# Patient Record
Sex: FEMALE | Race: BLACK OR AFRICAN AMERICAN | NOT HISPANIC OR LATINO | Employment: FULL TIME | URBAN - METROPOLITAN AREA
[De-identification: names, ages, dates, MRNs, and addresses within clinical notes are randomized per-mention and may not be internally consistent; named-entity substitution may affect disease eponyms.]

---

## 2023-02-20 PROBLEM — S62.600A FRACTURE OF PHALANX OF RIGHT INDEX FINGER: Status: ACTIVE | Noted: 2023-02-20

## 2023-02-20 PROBLEM — S62.630K: Status: ACTIVE | Noted: 2023-02-20

## 2023-02-20 PROBLEM — R53.83 FATIGUE: Status: ACTIVE | Noted: 2023-02-20

## 2023-02-20 PROBLEM — J01.90 ACUTE SINUSITIS: Status: ACTIVE | Noted: 2023-02-20

## 2023-02-20 PROBLEM — D64.9 ANEMIA: Status: ACTIVE | Noted: 2023-02-20

## 2023-02-20 PROBLEM — N95.1 POST MENOPAUSAL SYNDROME: Status: ACTIVE | Noted: 2023-02-20

## 2023-02-20 PROBLEM — M51.27 OTHER INTERVERTEBRAL DISC DISPLACEMENT, LUMBOSACRAL REGION: Status: ACTIVE | Noted: 2023-02-20

## 2023-02-20 PROBLEM — N76.0 BV (BACTERIAL VAGINOSIS): Status: ACTIVE | Noted: 2023-02-20

## 2023-02-20 PROBLEM — M51.26 HERNIATED NUCLEUS PULPOSUS, L4-5: Status: ACTIVE | Noted: 2023-02-20

## 2023-02-20 PROBLEM — J30.9 ALLERGIC RHINITIS: Status: ACTIVE | Noted: 2023-02-20

## 2023-02-20 PROBLEM — M25.50 POLYARTHRALGIA: Status: ACTIVE | Noted: 2023-02-20

## 2023-02-20 PROBLEM — M51.26 HERNIATED NUCLEUS PULPOSUS, L2-3: Status: ACTIVE | Noted: 2023-02-20

## 2023-02-20 PROBLEM — I47.10 SUPRAVENTRICULAR TACHYCARDIA BY ECG (CMS-HCC): Status: ACTIVE | Noted: 2023-02-20

## 2023-02-20 PROBLEM — M35.1 MCTD (MIXED CONNECTIVE TISSUE DISEASE) (MULTI): Status: ACTIVE | Noted: 2023-02-20

## 2023-02-20 PROBLEM — S61.309A NAIL AVULSION, FINGER: Status: ACTIVE | Noted: 2023-02-20

## 2023-02-20 PROBLEM — R01.1 HEART MURMUR: Status: ACTIVE | Noted: 2023-02-20

## 2023-02-20 PROBLEM — M51.26 HERNIATED NUCLEUS PULPOSUS, L3-4: Status: ACTIVE | Noted: 2023-02-20

## 2023-02-20 PROBLEM — R76.8 POSITIVE SM/RNP ANTIBODY: Status: ACTIVE | Noted: 2023-02-20

## 2023-02-20 PROBLEM — N95.1 PERIMENOPAUSAL: Status: ACTIVE | Noted: 2023-02-20

## 2023-02-20 PROBLEM — M32.9 SYSTEMIC LUPUS ERYTHEMATOSUS (MULTI): Status: ACTIVE | Noted: 2023-02-20

## 2023-02-20 PROBLEM — I10 BENIGN ESSENTIAL HYPERTENSION: Status: ACTIVE | Noted: 2023-02-20

## 2023-02-20 PROBLEM — R00.2 PALPITATIONS: Status: ACTIVE | Noted: 2023-02-20

## 2023-02-20 PROBLEM — M54.50 LOWER BACK PAIN: Status: ACTIVE | Noted: 2023-02-20

## 2023-02-20 PROBLEM — R76.8 SS-A ANTIBODY POSITIVE: Status: ACTIVE | Noted: 2023-02-20

## 2023-02-20 PROBLEM — R79.9 ABNORMAL BLOOD CHEMISTRY: Status: ACTIVE | Noted: 2023-02-20

## 2023-02-20 PROBLEM — R35.0 URINARY FREQUENCY: Status: ACTIVE | Noted: 2023-02-20

## 2023-02-20 PROBLEM — L81.9 HYPERPIGMENTATION OF SKIN: Status: ACTIVE | Noted: 2023-02-20

## 2023-02-20 PROBLEM — M25.561 RIGHT KNEE PAIN: Status: ACTIVE | Noted: 2023-02-20

## 2023-02-20 PROBLEM — M25.511 PAIN IN JOINT OF RIGHT SHOULDER: Status: ACTIVE | Noted: 2023-02-20

## 2023-02-20 PROBLEM — B96.89 BV (BACTERIAL VAGINOSIS): Status: ACTIVE | Noted: 2023-02-20

## 2023-02-20 PROBLEM — M17.12 DEGENERATIVE ARTHRITIS OF LEFT KNEE: Status: ACTIVE | Noted: 2023-02-20

## 2023-02-20 PROBLEM — M19.90 OSTEOARTHROSIS: Status: ACTIVE | Noted: 2023-02-20

## 2023-02-20 PROBLEM — E55.9 VITAMIN D DEFICIENCY: Status: ACTIVE | Noted: 2023-02-20

## 2023-02-20 RX ORDER — FLUTICASONE PROPIONATE 50 MCG
2 SPRAY, SUSPENSION (ML) NASAL
COMMUNITY
Start: 2020-03-31 | End: 2024-03-05 | Stop reason: ALTCHOICE

## 2023-02-20 RX ORDER — AMLODIPINE BESYLATE 10 MG/1
1 TABLET ORAL DAILY
COMMUNITY
Start: 2012-12-27 | End: 2023-07-10 | Stop reason: SDUPTHER

## 2023-02-20 RX ORDER — FOLIC ACID 1 MG/1
1 TABLET ORAL DAILY
COMMUNITY
Start: 2017-11-02

## 2023-02-20 RX ORDER — ERGOCALCIFEROL 1.25 MG/1
1 CAPSULE ORAL
COMMUNITY
Start: 2021-04-20 | End: 2024-03-05 | Stop reason: ALTCHOICE

## 2023-02-20 RX ORDER — HYDROCODONE BITARTRATE AND ACETAMINOPHEN 7.5; 325 MG/1; MG/1
1 TABLET ORAL EVERY 6 HOURS PRN
COMMUNITY
Start: 2022-08-19 | End: 2023-10-12 | Stop reason: ALTCHOICE

## 2023-02-20 RX ORDER — NALOXONE HYDROCHLORIDE 4 MG/.1ML
SPRAY NASAL
COMMUNITY
Start: 2022-05-13

## 2023-02-20 RX ORDER — TRIAMTERENE/HYDROCHLOROTHIAZID 37.5-25 MG
1 TABLET ORAL DAILY
COMMUNITY
Start: 2020-12-10 | End: 2023-11-20

## 2023-02-20 RX ORDER — HYDROCODONE BITARTRATE AND ACETAMINOPHEN 7.5; 325 MG/1; MG/1
1 TABLET ORAL 3 TIMES DAILY
COMMUNITY
Start: 2022-09-17 | End: 2023-10-12 | Stop reason: SDUPTHER

## 2023-02-20 RX ORDER — METHOTREXATE 2.5 MG/1
5 TABLET ORAL
COMMUNITY
Start: 2017-11-02

## 2023-02-20 RX ORDER — LIDOCAINE 50 MG/G
OINTMENT TOPICAL 3 TIMES DAILY
COMMUNITY
Start: 2021-04-15 | End: 2023-11-20

## 2023-02-20 RX ORDER — GABAPENTIN 300 MG/1
1 CAPSULE ORAL 2 TIMES DAILY
COMMUNITY
Start: 2017-03-17 | End: 2023-12-12 | Stop reason: ALTCHOICE

## 2023-03-21 ENCOUNTER — OFFICE VISIT (OUTPATIENT)
Dept: PRIMARY CARE | Facility: CLINIC | Age: 58
End: 2023-03-21
Payer: COMMERCIAL

## 2023-03-21 VITALS — SYSTOLIC BLOOD PRESSURE: 150 MMHG | DIASTOLIC BLOOD PRESSURE: 88 MMHG

## 2023-03-21 DIAGNOSIS — I10 BENIGN ESSENTIAL HYPERTENSION: Primary | ICD-10-CM

## 2023-03-21 DIAGNOSIS — Z00.00 ROUTINE HEALTH MAINTENANCE: ICD-10-CM

## 2023-03-21 DIAGNOSIS — E55.9 VITAMIN D DEFICIENCY: ICD-10-CM

## 2023-03-21 DIAGNOSIS — M32.9 SYSTEMIC LUPUS ERYTHEMATOSUS, UNSPECIFIED SLE TYPE, UNSPECIFIED ORGAN INVOLVEMENT STATUS (MULTI): ICD-10-CM

## 2023-03-21 PROCEDURE — 99203 OFFICE O/P NEW LOW 30 MIN: CPT | Performed by: INTERNAL MEDICINE

## 2023-03-21 PROCEDURE — 3077F SYST BP >= 140 MM HG: CPT | Performed by: INTERNAL MEDICINE

## 2023-03-21 PROCEDURE — 3079F DIAST BP 80-89 MM HG: CPT | Performed by: INTERNAL MEDICINE

## 2023-03-21 ASSESSMENT — ENCOUNTER SYMPTOMS
ABDOMINAL PAIN: 0
LIGHT-HEADEDNESS: 0
PALPITATIONS: 0
ABDOMINAL DISTENTION: 0
VOMITING: 0
DIARRHEA: 0
AGITATION: 0
FEVER: 0
WHEEZING: 0
WEAKNESS: 0
ACTIVITY CHANGE: 0
FATIGUE: 0
CONFUSION: 0
DIZZINESS: 0
SHORTNESS OF BREATH: 0
COUGH: 0
NAUSEA: 0

## 2023-03-21 NOTE — PROGRESS NOTES
Subjective   Roshni Loomis is a 57 y.o. female with PMHx of HTN and SLE who is here today to Establish Care.    HPI    Patient feeling well today with no current concerns or complaints. States that she is getting over some sinus congestion. Admits to some right heel pain from what she thinks is a developing heel spur.     PMHx: HTN, SLE  FamHx: HTN (mother), SLE (brother)   SurgHx: Fibroids   Allergies: None   SocHx: Never smoker, denies alcohol/marijuna/drugs       Review of Systems   Constitutional:  Negative for activity change, fatigue and fever.   Respiratory:  Negative for cough, shortness of breath and wheezing.    Cardiovascular:  Negative for chest pain, palpitations and leg swelling.   Gastrointestinal:  Negative for abdominal distention, abdominal pain, diarrhea, nausea and vomiting.   Neurological:  Negative for dizziness, weakness and light-headedness.   Psychiatric/Behavioral:  Negative for agitation and confusion.        Objective   Physical Exam  Constitutional:       Appearance: Normal appearance.   Cardiovascular:      Rate and Rhythm: Normal rate and regular rhythm.      Heart sounds: Normal heart sounds. No murmur heard.     No friction rub. No gallop.   Pulmonary:      Effort: Pulmonary effort is normal.      Breath sounds: Normal breath sounds. No wheezing or rhonchi.   Abdominal:      General: Bowel sounds are normal. There is no distension.      Palpations: Abdomen is soft.      Tenderness: There is no abdominal tenderness.   Musculoskeletal:         General: No swelling.   Skin:     General: Skin is warm and dry.      Findings: No bruising or rash.   Neurological:      General: No focal deficit present.      Mental Status: She is alert and oriented to person, place, and time.   Psychiatric:         Mood and Affect: Mood normal.         Thought Content: Thought content normal.         Judgment: Judgment normal.         Assessment/Plan   Problem List Items Addressed This Visit           Circulatory    Benign essential hypertension - Primary     - Stable, BP today in office 150/88   - Continue Amlodipine 10mg daily          Relevant Orders    Comprehensive Metabolic Panel    Lipid Panel    TSH with reflex to Free T4 if abnormal    Albumin, urine, random       Endocrine/Metabolic    Vitamin D deficiency    Relevant Orders    Vitamin D 1,25 Dihydroxy       Other    Systemic lupus erythematosus (CMS/HCC)    Relevant Orders    CBC and Auto Differential     Other Visit Diagnoses       Routine health maintenance        Relevant Orders    Comprehensive Metabolic Panel    Hemoglobin A1C    Lipid Panel    TSH with reflex to Free T4 if abnormal    Vitamin D 1,25 Dihydroxy    CBC and Auto Differential          Health Maintenance  - New patient will check yearly labs: CBC, CMP, Vitamin D, Lipids, TSH, HbA1c  - Mammogram/Pap UTD, follows with OB/GYN  - Colonoscopy UTD    Follow up in 3 months

## 2023-03-27 LAB
APPEARANCE, URINE: CLEAR
BILIRUBIN, URINE: NEGATIVE
BLOOD, URINE: NEGATIVE
COLOR, URINE: YELLOW
GLUCOSE, URINE: NEGATIVE MG/DL
KETONES, URINE: NEGATIVE MG/DL
LEUKOCYTE ESTERASE, URINE: NEGATIVE
NITRITE, URINE: NEGATIVE
PH, URINE: 5 (ref 5–8)
PROTEIN, URINE: NEGATIVE MG/DL
SPECIFIC GRAVITY, URINE: 1.02 (ref 1–1.03)
UROBILINOGEN, URINE: <2 MG/DL (ref 0–1.9)

## 2023-03-28 LAB — URINE CULTURE: NORMAL

## 2023-03-30 ENCOUNTER — LAB (OUTPATIENT)
Dept: LAB | Facility: LAB | Age: 58
End: 2023-03-30
Payer: COMMERCIAL

## 2023-03-30 DIAGNOSIS — M32.9 SYSTEMIC LUPUS ERYTHEMATOSUS, UNSPECIFIED SLE TYPE, UNSPECIFIED ORGAN INVOLVEMENT STATUS (MULTI): ICD-10-CM

## 2023-03-30 DIAGNOSIS — I10 BENIGN ESSENTIAL HYPERTENSION: ICD-10-CM

## 2023-03-30 DIAGNOSIS — E55.9 VITAMIN D DEFICIENCY: ICD-10-CM

## 2023-03-30 DIAGNOSIS — Z00.00 ROUTINE HEALTH MAINTENANCE: ICD-10-CM

## 2023-03-30 LAB
ALANINE AMINOTRANSFERASE (SGPT) (U/L) IN SER/PLAS: 13 U/L (ref 7–45)
ALBUMIN (G/DL) IN SER/PLAS: 4.1 G/DL (ref 3.4–5)
ALBUMIN (MG/L) IN URINE: 20.2 MG/L
ALBUMIN/CREATININE (UG/MG) IN URINE: 14.4 UG/MG CRT (ref 0–30)
ALKALINE PHOSPHATASE (U/L) IN SER/PLAS: 42 U/L (ref 33–110)
ANION GAP IN SER/PLAS: 11 MMOL/L (ref 10–20)
ASPARTATE AMINOTRANSFERASE (SGOT) (U/L) IN SER/PLAS: 17 U/L (ref 9–39)
BASOPHILS (10*3/UL) IN BLOOD BY AUTOMATED COUNT: 0.03 X10E9/L (ref 0–0.1)
BASOPHILS/100 LEUKOCYTES IN BLOOD BY AUTOMATED COUNT: 0.8 % (ref 0–2)
BILIRUBIN TOTAL (MG/DL) IN SER/PLAS: 0.5 MG/DL (ref 0–1.2)
CALCIUM (MG/DL) IN SER/PLAS: 8.9 MG/DL (ref 8.6–10.3)
CARBON DIOXIDE, TOTAL (MMOL/L) IN SER/PLAS: 30 MMOL/L (ref 21–32)
CHLORIDE (MMOL/L) IN SER/PLAS: 104 MMOL/L (ref 98–107)
CHOLESTEROL (MG/DL) IN SER/PLAS: 127 MG/DL (ref 0–199)
CHOLESTEROL IN HDL (MG/DL) IN SER/PLAS: 48.5 MG/DL
CHOLESTEROL/HDL RATIO: 2.6
CREATININE (MG/DL) IN SER/PLAS: 0.8 MG/DL (ref 0.5–1.05)
CREATININE (MG/DL) IN URINE: 140 MG/DL (ref 20–320)
EOSINOPHILS (10*3/UL) IN BLOOD BY AUTOMATED COUNT: 0.05 X10E9/L (ref 0–0.7)
EOSINOPHILS/100 LEUKOCYTES IN BLOOD BY AUTOMATED COUNT: 1.4 % (ref 0–6)
ERYTHROCYTE DISTRIBUTION WIDTH (RATIO) BY AUTOMATED COUNT: 14.9 % (ref 11.5–14.5)
ERYTHROCYTE MEAN CORPUSCULAR HEMOGLOBIN CONCENTRATION (G/DL) BY AUTOMATED: 31.1 G/DL (ref 32–36)
ERYTHROCYTE MEAN CORPUSCULAR VOLUME (FL) BY AUTOMATED COUNT: 90 FL (ref 80–100)
ERYTHROCYTES (10*6/UL) IN BLOOD BY AUTOMATED COUNT: 4.33 X10E12/L (ref 4–5.2)
ESTIMATED AVERAGE GLUCOSE FOR HBA1C: 117 MG/DL
GFR FEMALE: 87 ML/MIN/1.73M2
GLUCOSE (MG/DL) IN SER/PLAS: 80 MG/DL (ref 74–99)
HEMATOCRIT (%) IN BLOOD BY AUTOMATED COUNT: 38.9 % (ref 36–46)
HEMOGLOBIN (G/DL) IN BLOOD: 12.1 G/DL (ref 12–16)
HEMOGLOBIN A1C/HEMOGLOBIN TOTAL IN BLOOD: 5.7 %
IMMATURE GRANULOCYTES/100 LEUKOCYTES IN BLOOD BY AUTOMATED COUNT: 0.6 % (ref 0–0.9)
LDL: 69 MG/DL (ref 0–99)
LEUKOCYTES (10*3/UL) IN BLOOD BY AUTOMATED COUNT: 3.6 X10E9/L (ref 4.4–11.3)
LYMPHOCYTES (10*3/UL) IN BLOOD BY AUTOMATED COUNT: 1.71 X10E9/L (ref 1.2–4.8)
LYMPHOCYTES/100 LEUKOCYTES IN BLOOD BY AUTOMATED COUNT: 47.9 % (ref 13–44)
MONOCYTES (10*3/UL) IN BLOOD BY AUTOMATED COUNT: 0.47 X10E9/L (ref 0.1–1)
MONOCYTES/100 LEUKOCYTES IN BLOOD BY AUTOMATED COUNT: 13.2 % (ref 2–10)
NEUTROPHILS (10*3/UL) IN BLOOD BY AUTOMATED COUNT: 1.29 X10E9/L (ref 1.2–7.7)
NEUTROPHILS/100 LEUKOCYTES IN BLOOD BY AUTOMATED COUNT: 36.1 % (ref 40–80)
PLATELETS (10*3/UL) IN BLOOD AUTOMATED COUNT: 405 X10E9/L (ref 150–450)
POTASSIUM (MMOL/L) IN SER/PLAS: 3.7 MMOL/L (ref 3.5–5.3)
PROTEIN TOTAL: 7.9 G/DL (ref 6.4–8.2)
SODIUM (MMOL/L) IN SER/PLAS: 141 MMOL/L (ref 136–145)
THYROTROPIN (MIU/L) IN SER/PLAS BY DETECTION LIMIT <= 0.05 MIU/L: 1.73 MIU/L (ref 0.44–3.98)
TRIGLYCERIDE (MG/DL) IN SER/PLAS: 48 MG/DL (ref 0–149)
UREA NITROGEN (MG/DL) IN SER/PLAS: 16 MG/DL (ref 6–23)
VLDL: 10 MG/DL (ref 0–40)

## 2023-03-30 PROCEDURE — 84443 ASSAY THYROID STIM HORMONE: CPT

## 2023-03-30 PROCEDURE — 82570 ASSAY OF URINE CREATININE: CPT

## 2023-03-30 PROCEDURE — 83036 HEMOGLOBIN GLYCOSYLATED A1C: CPT

## 2023-03-30 PROCEDURE — 82043 UR ALBUMIN QUANTITATIVE: CPT

## 2023-03-30 PROCEDURE — 85025 COMPLETE CBC W/AUTO DIFF WBC: CPT

## 2023-03-30 PROCEDURE — 80053 COMPREHEN METABOLIC PANEL: CPT

## 2023-03-30 PROCEDURE — 82652 VIT D 1 25-DIHYDROXY: CPT

## 2023-03-30 PROCEDURE — 36415 COLL VENOUS BLD VENIPUNCTURE: CPT

## 2023-03-30 PROCEDURE — 80061 LIPID PANEL: CPT

## 2023-04-03 LAB — VITAMIN D 1,25-DIHYDROXY: 51.8 PG/ML (ref 19.9–79.3)

## 2023-04-27 LAB
6-ACETYLMORPHINE: <25 NG/ML
7-AMINOCLONAZEPAM: <25 NG/ML
ALPHA-HYDROXYALPRAZOLAM: <25 NG/ML
ALPHA-HYDROXYMIDAZOLAM: <25 NG/ML
ALPRAZOLAM: <25 NG/ML
AMPHETAMINE (PRESENCE) IN URINE BY SCREEN METHOD: ABNORMAL
BARBITURATES PRESENCE IN URINE BY SCREEN METHOD: ABNORMAL
CANNABINOIDS IN URINE BY SCREEN METHOD: ABNORMAL
CHLORDIAZEPOXIDE: <25 NG/ML
CLONAZEPAM: <25 NG/ML
COCAINE (PRESENCE) IN URINE BY SCREEN METHOD: ABNORMAL
CODEINE: <50 NG/ML
CREATINE, URINE FOR DRUG: 80.4 MG/DL
DIAZEPAM: <25 NG/ML
DRUG SCREEN COMMENT URINE: ABNORMAL
EDDP: <25 NG/ML
FENTANYL CONFIRMATION, URINE: <2.5 NG/ML
HYDROCODONE: 902 NG/ML
HYDROMORPHONE: 146 NG/ML
LORAZEPAM: <25 NG/ML
METHADONE CONFIRMATION,URINE: <25 NG/ML
MIDAZOLAM: <25 NG/ML
MORPHINE URINE: <50 NG/ML
NORDIAZEPAM: <25 NG/ML
NORFENTANYL: <2.5 NG/ML
NORHYDROCODONE: 624 NG/ML
NOROXYCODONE: <25 NG/ML
O-DESMETHYLTRAMADOL: <50 NG/ML
OXAZEPAM: <25 NG/ML
OXYCODONE: <25 NG/ML
OXYMORPHONE: <25 NG/ML
PHENCYCLIDINE (PRESENCE) IN URINE BY SCREEN METHOD: ABNORMAL
TEMAZEPAM: <25 NG/ML
TRAMADOL: <50 NG/ML
ZOLPIDEM METABOLITE (ZCA): <25 NG/ML
ZOLPIDEM: <25 NG/ML

## 2023-06-05 ENCOUNTER — HOSPITAL ENCOUNTER (OUTPATIENT)
Dept: DATA CONVERSION | Facility: HOSPITAL | Age: 58
End: 2023-06-05
Attending: PHYSICAL MEDICINE & REHABILITATION | Admitting: PHYSICAL MEDICINE & REHABILITATION
Payer: COMMERCIAL

## 2023-06-05 DIAGNOSIS — M51.26 OTHER INTERVERTEBRAL DISC DISPLACEMENT, LUMBAR REGION: ICD-10-CM

## 2023-07-10 DIAGNOSIS — I10 BENIGN ESSENTIAL HYPERTENSION: Primary | ICD-10-CM

## 2023-07-11 RX ORDER — AMLODIPINE BESYLATE 10 MG/1
10 TABLET ORAL DAILY
Qty: 90 TABLET | Refills: 1 | Status: SHIPPED | OUTPATIENT
Start: 2023-07-11 | End: 2023-07-13 | Stop reason: SDUPTHER

## 2023-07-13 DIAGNOSIS — I10 BENIGN ESSENTIAL HYPERTENSION: ICD-10-CM

## 2023-07-13 RX ORDER — AMLODIPINE BESYLATE 10 MG/1
10 TABLET ORAL DAILY
Qty: 90 TABLET | Refills: 1 | Status: SHIPPED | OUTPATIENT
Start: 2023-07-13 | End: 2023-07-13

## 2023-08-15 DIAGNOSIS — M79.671 RIGHT FOOT PAIN: Primary | ICD-10-CM

## 2023-08-17 ENCOUNTER — TELEMEDICINE (OUTPATIENT)
Dept: PRIMARY CARE | Facility: CLINIC | Age: 58
End: 2023-08-17
Payer: COMMERCIAL

## 2023-08-17 DIAGNOSIS — M77.31 CALCANEAL SPUR OF RIGHT FOOT: Primary | ICD-10-CM

## 2023-08-17 DIAGNOSIS — M77.31 HEEL SPUR, RIGHT: Primary | ICD-10-CM

## 2023-08-17 PROBLEM — M77.32 CALCANEAL SPUR OF LEFT FOOT: Status: RESOLVED | Noted: 2023-08-17 | Resolved: 2023-08-17

## 2023-08-17 PROBLEM — M77.32 CALCANEAL SPUR OF LEFT FOOT: Status: ACTIVE | Noted: 2023-08-17

## 2023-08-17 PROCEDURE — 99213 OFFICE O/P EST LOW 20 MIN: CPT | Performed by: INTERNAL MEDICINE

## 2023-08-17 NOTE — PROGRESS NOTES
Subjective   Patient ID: Roshni Loomis is a 58 y.o. female who presents via virtual visit for Follow-up.  An interactive audio and video telecommunication system which permits real time communications between the patient (at the originating site) and provider (at the distant site) was utilized to provide this telehealth service.    Verbal consent was requested and obtained from the patient on the day of encounter.    HPI  Left foot pain  -heel spurs noted on xray  -not sure if she wants to see a foot doctor at this time      Review of Systems   All other systems reviewed and are negative.    Assessment/Plan     Problem List Items Addressed This Visit    None  Visit Diagnoses       Heel spur, right    -  Primary    shown on xray  referral placed to ortho foot/ankle  Instructed to take Aleve every 12 hours scheduled x 2 weeks

## 2023-09-30 NOTE — H&P
History & Physical Reviewed:   Pregnant/Lactating:  ·  Are You Pregnant no   ·  Are You Currently Breastfeeding no     I have reviewed the History and Physical dated:  05-Jun-2023   History and Physical reviewed and relevant findings noted. Patient examined to review pertinent physical  findings.: No significant changes   Home Medications Reviewed: no changes noted   Allergies Reviewed: no changes noted       ERAS (Enhanced Recovery After Surgery):  ·  ERAS Patient: no     Consent:   COVID-19 Consent:  ·  COVID-19 Risk Consent Surgeon has reviewed key risks related to the risk of azael COVID-19 and if they contract COVID-19 what the risks are.       Electronic Signatures:  Iván Johns)  (Signed 05-Jun-2023 07:37)   Authored: History & Physical Reviewed, ERAS, Consent,  Note Completion      Last Updated: 05-Jun-2023 07:37 by Iván Johns)

## 2023-10-12 ENCOUNTER — OFFICE VISIT (OUTPATIENT)
Dept: PAIN MEDICINE | Facility: CLINIC | Age: 58
End: 2023-10-12
Payer: COMMERCIAL

## 2023-10-12 DIAGNOSIS — M51.26 HERNIATED NUCLEUS PULPOSUS, L3-4: ICD-10-CM

## 2023-10-12 DIAGNOSIS — M51.26 HERNIATED NUCLEUS PULPOSUS, L2-3: ICD-10-CM

## 2023-10-12 DIAGNOSIS — M51.26 HERNIATED NUCLEUS PULPOSUS, L4-5: Primary | ICD-10-CM

## 2023-10-12 DIAGNOSIS — M22.42 CHONDROMALACIA OF LEFT PATELLA: ICD-10-CM

## 2023-10-12 PROCEDURE — 99214 OFFICE O/P EST MOD 30 MIN: CPT | Performed by: PHYSICAL MEDICINE & REHABILITATION

## 2023-10-12 RX ORDER — HYDROCODONE BITARTRATE AND ACETAMINOPHEN 7.5; 325 MG/1; MG/1
1 TABLET ORAL EVERY 8 HOURS PRN
Qty: 84 TABLET | Refills: 0 | OUTPATIENT
Start: 2023-10-21 | End: 2023-11-20

## 2023-10-12 RX ORDER — HYDROCODONE BITARTRATE AND ACETAMINOPHEN 7.5; 325 MG/1; MG/1
1 TABLET ORAL EVERY 8 HOURS PRN
Qty: 84 TABLET | Refills: 0 | Status: SHIPPED | OUTPATIENT
Start: 2023-11-18 | End: 2023-12-12 | Stop reason: SDUPTHER

## 2023-10-12 NOTE — H&P (VIEW-ONLY)
[facility]  Claim [id number]   D.O.I 06.30.2005    · Herniated nucleus pulposus, L2-3 M51.26)   · Herniated nucleus pulposus, L3-4 (M51.26)   · Herniated nucleus pulposus, L4-5 (M51.26)   . 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  She has a previous steroid injection in July and that helped her by over 85% until recently.  The pain is getting worse on the right side.  The pain in the left lower limb P cell better.  She wanted to repeat the injection on the right side before symptoms gets worse again.  With the pain aggravation she is having to miss time from work and she needed FMLA form filled  the pain in the lower limb is reaching the lateral legs. that is burning and tingling on a continuous fashion. the pain goes in aggravation intermittently with sharp lancinating, electrical like jolts and sensations. These are felt on the skin and deeper in the tissues.       the pain in the knee is mechanical in characteristics , continuous and gets worse with movements mainly with forward flexion and bending.      the pain is rated at 8 /10 without the medications. But, with the pain medications the pain level drops to 5/10 for the back and the lower limbs pain     no bowel and bladder incontinence      opioids treatment agreement renewed February 2023   Oarrs pulled and scanned in the chart no concerns OD score is 050 Narx is 361   last urine toxicology testing in May 2022 and that was compatible with the medications prescribed   Xray updated for the knee April 2018   ORT Score is zero  Pain pathology and pain generators knee and lumbar spine   Modalities tried injections intra articular steroid injection and epidural steroid injections      The control of the pain with the pain medications is helping the control of the symptoms and allowing the function and activities of daily living, enjoyment of  life, quality of life and sleep with less interruption by the pain. The goal is symptomatic control of the nonmalignant chronic pain and not to repair the permanent damage in the tissues inducing the chronic pain conditions. We are aiming to shift the focus from the nonmalignant chronic pain to other aspects of life by symptomatically treating this chronic pain.      Review of Systems  Denied any fever or chills. No weight loss and no night sweats. No cough or sputum production. No diarrhea   The constipation has been responding to fibers and over the counter medications.      No bladder and bowel incontinence and no other changes in bladder and bowel. No skin changes. Reports tiredness and fatigability only if the pain is not controlled.   Denied opioids diversion and abuse and denies alcoholism. Denies overuse of the pain medications.     Physical examination  declined Chaperone for the visit and was adequately  draped for the exam.  Awake, alert and oriented x 3    breathing with normal rate and rhythm   limping on the left . no cane no assistive devices      Straight leg raising positive on the right side at 30 degrees from horizontal a with the pain reaching the lateral malleoli. decreaed sensory to light touch over the lateral legs      left knee showed tenderness over the joint line medially and laterally no instability but she has minimal clinical effusion. no changes in the color or texture of the skin      Mary testing are negative straight leg raising increased the pain in the back no overt radicular symptoms at this time     no aberrant pain behavior. she continues to have the positive findings waiting on the epidural steroid injections     Diagnosis  See above    Plan  reviewed the pain generators in the lumbar spine and   lower limb and left knee  Discussed the mechanism of action of the intra articular steroid injection and epidural steroid injections   I also went over the mechanism of action of  the spinal cord stimulator     Went over the pain medications and mechanism of action and side effects  Home exercises program   renewed prescription for hydrocodone 7.5 mg at orally at 4 times a day I explained the side effects from the acetaminophen in the medication and made aware of those. I also explained about the cumulative effects on the organs and mainly the liver.      continue with gabapentin for the neuropathic pain associated with the herniated nucleus pulposus       Continue with the current treatment plan since that is working and controlling the symptoms. the UDS and Oarrs are compliant. No reported side effects and the daily functions and activities of daily living are improved with the pain control. Additionally other modalities including interventional and non opioids modalities were tried without relief to allow improvement of the activities of daily living , quality of life and quality of sleep.     Given the opioids therapy , we discussed about the risk for accidental over dose on the pain medications. I went over the mechanism of action and mode of use of the Naloxone according to the  recommendations.     we had a long discussion that the goal is to keep the pain medications to a minimum or possibly come off the medications completely      UDS for compliance check, even with low risk for OUD but we still need the UDS for compliance and confirmation.   long discussion for 10 minutes, above and beyond the office visit, about opioids tolerance and opioids receptors regulation and drug holidays to help against that.      discussed with her about the epidural steroid injections and the pain is getting worse . will check on the C9 for right side L45 transforaminal epidural steroid injections We discussed the risks and benefits of the injections including but not limited to nerve injury, infection, bleeding , headaches and paralysis and remotely death. The patient agreed to proceed, will  perform the procedure with fluoro guidance.   We will put in a C9 for right   L4L 5 transforaminal epidural steroid injections . Proc 2x 64483 -[id number] , diagnosis M51.26,     The level of clinical decision making in this office visit,  is high, given the high risks of complications with the morbidity and mortality due to the fact that acute and chronic pain may pose a threat to life and bodily function, if under treated, poorly treated, or with failure to maintain adequate treatment and timely medical follow up. Additionally over treatment has its own set of complications including overdosing on the pain medications and also the habit forming potentials with the use of the medications used to treat chronic painful conditions including therapeutic classes classified as dangerous medications. Given the serious and fluctuating nature of pain level and instensity with extensive consideration for whenever pain changes, there is always the risk of prolonged functional impairment requiring close patient monitoring with regular assessments and reassessments and high level medical decision making at every office visit. The amount and complexity of data reviewed is high given the patient clinical presentation, labs,  data, radiology reports, and other tests as discussed during office visits. Pertinent data whether positive or negative were taken in consideration in the process of making this high level medical decision.    We discussed that we are prescribing the medications on good barber and legitimate medical reason.     Follow-up after the above

## 2023-10-12 NOTE — PROGRESS NOTES
[facility]  Claim [id number]   D.O.I 06.30.2005    · Herniated nucleus pulposus, L2-3 M51.26)   · Herniated nucleus pulposus, L3-4 (M51.26)   · Herniated nucleus pulposus, L4-5 (M51.26)   . 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  She has a previous steroid injection in July and that helped her by over 85% until recently.  The pain is getting worse on the right side.  The pain in the left lower limb P cell better.  She wanted to repeat the injection on the right side before symptoms gets worse again.  With the pain aggravation she is having to miss time from work and she needed FMLA form filled  the pain in the lower limb is reaching the lateral legs. that is burning and tingling on a continuous fashion. the pain goes in aggravation intermittently with sharp lancinating, electrical like jolts and sensations. These are felt on the skin and deeper in the tissues.       the pain in the knee is mechanical in characteristics , continuous and gets worse with movements mainly with forward flexion and bending.      the pain is rated at 8 /10 without the medications. But, with the pain medications the pain level drops to 5/10 for the back and the lower limbs pain     no bowel and bladder incontinence      opioids treatment agreement renewed February 2023   Oarrs pulled and scanned in the chart no concerns OD score is 050 Narx is 361   last urine toxicology testing in May 2022 and that was compatible with the medications prescribed   Xray updated for the knee April 2018   ORT Score is zero  Pain pathology and pain generators knee and lumbar spine   Modalities tried injections intra articular steroid injection and epidural steroid injections      The control of the pain with the pain medications is helping the control of the symptoms and allowing the function and activities of daily living, enjoyment of  life, quality of life and sleep with less interruption by the pain. The goal is symptomatic control of the nonmalignant chronic pain and not to repair the permanent damage in the tissues inducing the chronic pain conditions. We are aiming to shift the focus from the nonmalignant chronic pain to other aspects of life by symptomatically treating this chronic pain.      Review of Systems  Denied any fever or chills. No weight loss and no night sweats. No cough or sputum production. No diarrhea   The constipation has been responding to fibers and over the counter medications.      No bladder and bowel incontinence and no other changes in bladder and bowel. No skin changes. Reports tiredness and fatigability only if the pain is not controlled.   Denied opioids diversion and abuse and denies alcoholism. Denies overuse of the pain medications.     Physical examination  declined Chaperone for the visit and was adequately  draped for the exam.  Awake, alert and oriented x 3    breathing with normal rate and rhythm   limping on the left . no cane no assistive devices      Straight leg raising positive on the right side at 30 degrees from horizontal a with the pain reaching the lateral malleoli. decreaed sensory to light touch over the lateral legs      left knee showed tenderness over the joint line medially and laterally no instability but she has minimal clinical effusion. no changes in the color or texture of the skin      Mary testing are negative straight leg raising increased the pain in the back no overt radicular symptoms at this time     no aberrant pain behavior. she continues to have the positive findings waiting on the epidural steroid injections     Diagnosis  See above    Plan  reviewed the pain generators in the lumbar spine and   lower limb and left knee  Discussed the mechanism of action of the intra articular steroid injection and epidural steroid injections   I also went over the mechanism of action of  the spinal cord stimulator     Went over the pain medications and mechanism of action and side effects  Home exercises program   renewed prescription for hydrocodone 7.5 mg at orally at 4 times a day I explained the side effects from the acetaminophen in the medication and made aware of those. I also explained about the cumulative effects on the organs and mainly the liver.      continue with gabapentin for the neuropathic pain associated with the herniated nucleus pulposus       Continue with the current treatment plan since that is working and controlling the symptoms. the UDS and Oarrs are compliant. No reported side effects and the daily functions and activities of daily living are improved with the pain control. Additionally other modalities including interventional and non opioids modalities were tried without relief to allow improvement of the activities of daily living , quality of life and quality of sleep.     Given the opioids therapy , we discussed about the risk for accidental over dose on the pain medications. I went over the mechanism of action and mode of use of the Naloxone according to the  recommendations.     we had a long discussion that the goal is to keep the pain medications to a minimum or possibly come off the medications completely      UDS for compliance check, even with low risk for OUD but we still need the UDS for compliance and confirmation.   long discussion for 10 minutes, above and beyond the office visit, about opioids tolerance and opioids receptors regulation and drug holidays to help against that.      discussed with her about the epidural steroid injections and the pain is getting worse . will check on the C9 for right side L45 transforaminal epidural steroid injections We discussed the risks and benefits of the injections including but not limited to nerve injury, infection, bleeding , headaches and paralysis and remotely death. The patient agreed to proceed, will  perform the procedure with fluoro guidance.   We will put in a C9 for right   L4L 5 transforaminal epidural steroid injections . Proc 2x 64483 -50 , diagnosis M51.26,     The level of clinical decision making in this office visit,  is high, given the high risks of complications with the morbidity and mortality due to the fact that acute and chronic pain may pose a threat to life and bodily function, if under treated, poorly treated, or with failure to maintain adequate treatment and timely medical follow up. Additionally over treatment has its own set of complications including overdosing on the pain medications and also the habit forming potentials with the use of the medications used to treat chronic painful conditions including therapeutic classes classified as dangerous medications. Given the serious and fluctuating nature of pain level and instensity with extensive consideration for whenever pain changes, there is always the risk of prolonged functional impairment requiring close patient monitoring with regular assessments and reassessments and high level medical decision making at every office visit. The amount and complexity of data reviewed is high given the patient clinical presentation, labs,  data, radiology reports, and other tests as discussed during office visits. Pertinent data whether positive or negative were taken in consideration in the process of making this high level medical decision.    We discussed that we are prescribing the medications on good barber and legitimate medical reason.     Follow-up after the above

## 2023-10-19 ENCOUNTER — OFFICE VISIT (OUTPATIENT)
Dept: ORTHOPEDIC SURGERY | Facility: CLINIC | Age: 58
End: 2023-10-19
Payer: COMMERCIAL

## 2023-10-19 DIAGNOSIS — R29.818 NEUROLOGIC ABNORMALITY: Primary | ICD-10-CM

## 2023-10-19 DIAGNOSIS — M77.31 CALCANEAL SPUR OF RIGHT FOOT: ICD-10-CM

## 2023-10-19 PROCEDURE — 99203 OFFICE O/P NEW LOW 30 MIN: CPT | Performed by: ORTHOPAEDIC SURGERY

## 2023-10-19 PROCEDURE — 99213 OFFICE O/P EST LOW 20 MIN: CPT | Performed by: ORTHOPAEDIC SURGERY

## 2023-10-19 PROCEDURE — 1036F TOBACCO NON-USER: CPT | Performed by: ORTHOPAEDIC SURGERY

## 2023-10-19 NOTE — LETTER
"October 19, 2023     Gale Osborne DO  [address]  [facility] [id number]    Patient: Roshni Loomis   YOB: 1965   Date of Visit: 10/19/2023       Dear Dr. Gale Osborne DO:    Thank you for referring Roshni Loomis to me for evaluation. Below are my notes for this consultation.  If you have questions, please do not hesitate to call me. I look forward to following your patient along with you.       Sincerely,     Benjamin Velasquez MD      CC: No Recipients  ______________________________________________________________________________________    Right foot evaluation    This 58-year-old woman complains of pain over the last year or more over the lateral side of her right heel.  She notes the pain is intermittent and can occur at anytime of the day or night.  Sometimes it wakes her up when she is in bed at night.    The patient denies any injury.  Patient does note numbness in her first and second toe which she tells me is from her \"sciatica with for herniated lumbar disks\".    The patient has a past medical history of \"heel spurs\" which produce pain on the plantar aspect of her heel but this is totally resolved.    Further complicating her situation is her past medical history which includes herniated nucleus pulposus at L2-3, L4-5, L3-4, hypertension, SS-A antibody positive, systemic lupus, supraventricular tachycardia to BMI greater than 30.    Review of systems:  A complete review of the patient's past medical history and review of 30 systems and all medications and allergies was performed. Please see adult medical record for details.    A Family history for genetic or familial inheritance issues and Social history including smoking history, alcohol consumption and exercise activities was also reviewed and significant findings noted in the patients history of present illness.      Physical Exam:  The patient is well-nourished and well-developed and in no acute distress. The patient " displayed normal mood and affect. The patient's pupils were equal, round sclerae are white. Patient's respirations appear to be regular and unlabored.    Examination of the patient's right foot and ankle revealed the following. The patient's gait and stance revealed mild pes planus and pronation.  There did not appear to be any skin abnormalities or lymphangitis. There was no swelling noted and no erythema.  There was hallux valgus deformity.  There was no tenderness to palpation.  There was no tenderness over the plantar fascia.  Ankle range of motion was full. Subtalar motion was full and midtarsal motion was full. The Silfverskiold test was positive.   The neurovascular examination was intact. The neurological exam including motor and sensory exam was performed. The vascular examination including palpation of pulses and capillary refill of the foot was performed and determined to be intact.    Imaging:  I personally reviewed and measured the radiographs including AP and lateral views of the extremity and they revealed hallux valgus deformity and a calcaneal traction spur.    It is my impression this patient's pain is probably neurologic in origin.  There is no radiologic answer for her pain and no tenderness at the plantar fascia or Achilles insertion.  Pain occurs at night and wakes her up in bed which is much more likely to be neurologic than musculoskeletal.  I did offer to inject the areas of trigger point but the patient preferred not to have this procedure performed.    I would be delighted to see the patient back the future should  they  have further problems.    Note dictated with [employer] software.  Completed without full type editing and sent to avoid delay.

## 2023-10-19 NOTE — PROGRESS NOTES
"Right foot evaluation    This 58-year-old woman complains of pain over the last year or more over the lateral side of her right heel.  She notes the pain is intermittent and can occur at anytime of the day or night.  Sometimes it wakes her up when she is in bed at night.    The patient denies any injury.  Patient does note numbness in her first and second toe which she tells me is from her \"sciatica with for herniated lumbar disks\".    The patient has a past medical history of \"heel spurs\" which produce pain on the plantar aspect of her heel but this is totally resolved.    Further complicating her situation is her past medical history which includes herniated nucleus pulposus at L2-3, L4-5, L3-4, hypertension, SS-A antibody positive, systemic lupus, supraventricular tachycardia to BMI greater than 30.    Review of systems:  A complete review of the patient's past medical history and review of 30 systems and all medications and allergies was performed. Please see adult medical record for details.    A Family history for genetic or familial inheritance issues and Social history including smoking history, alcohol consumption and exercise activities was also reviewed and significant findings noted in the patients history of present illness.      Physical Exam:  The patient is well-nourished and well-developed and in no acute distress. The patient displayed normal mood and affect. The patient's pupils were equal, round sclerae are white. Patient's respirations appear to be regular and unlabored.    Examination of the patient's right foot and ankle revealed the following. The patient's gait and stance revealed mild pes planus and pronation.  There did not appear to be any skin abnormalities or lymphangitis. There was no swelling noted and no erythema.  There was hallux valgus deformity.  There was no tenderness to palpation.  There was no tenderness over the plantar fascia.  Ankle range of motion was full. Subtalar " motion was full and midtarsal motion was full. The Silfverskiold test was positive.   The neurovascular examination was intact. The neurological exam including motor and sensory exam was performed. The vascular examination including palpation of pulses and capillary refill of the foot was performed and determined to be intact.    Imaging:  I personally reviewed and measured the radiographs including AP and lateral views of the extremity and they revealed hallux valgus deformity and a calcaneal traction spur.    It is my impression this patient's pain is probably neurologic in origin.  There is no radiologic answer for her pain and no tenderness at the plantar fascia or Achilles insertion.  Pain occurs at night and wakes her up in bed which is much more likely to be neurologic than musculoskeletal.  I did offer to inject the areas of trigger point but the patient preferred not to have this procedure performed.    I would be delighted to see the patient back the future should  they  have further problems.    Note dictated with [employer] software.  Completed without full type editing and sent to avoid delay.

## 2023-10-26 DIAGNOSIS — M51.26 HERNIATED NUCLEUS PULPOSUS, L4-5: Primary | ICD-10-CM

## 2023-11-06 ENCOUNTER — ANCILLARY PROCEDURE (OUTPATIENT)
Dept: RADIOLOGY | Facility: CLINIC | Age: 58
End: 2023-11-06
Payer: COMMERCIAL

## 2023-11-06 ENCOUNTER — HOSPITAL ENCOUNTER (OUTPATIENT)
Dept: OPERATING ROOM | Facility: CLINIC | Age: 58
Discharge: HOME | End: 2023-11-06
Payer: COMMERCIAL

## 2023-11-06 VITALS
SYSTOLIC BLOOD PRESSURE: 170 MMHG | TEMPERATURE: 98.1 F | DIASTOLIC BLOOD PRESSURE: 87 MMHG | HEIGHT: 65 IN | RESPIRATION RATE: 16 BRPM | WEIGHT: 180.12 LBS | OXYGEN SATURATION: 98 % | BODY MASS INDEX: 30.01 KG/M2 | HEART RATE: 59 BPM

## 2023-11-06 DIAGNOSIS — M51.26 HERNIATED NUCLEUS PULPOSUS, L4-5: ICD-10-CM

## 2023-11-06 PROCEDURE — 2550000001 HC RX 255 CONTRASTS: Performed by: PHYSICAL MEDICINE & REHABILITATION

## 2023-11-06 PROCEDURE — 64483 NJX AA&/STRD TFRM EPI L/S 1: CPT | Mod: RT

## 2023-11-06 PROCEDURE — 76000 FLUOROSCOPY <1 HR PHYS/QHP: CPT

## 2023-11-06 PROCEDURE — 64483 NJX AA&/STRD TFRM EPI L/S 1: CPT | Performed by: PHYSICAL MEDICINE & REHABILITATION

## 2023-11-06 PROCEDURE — 96372 THER/PROPH/DIAG INJ SC/IM: CPT | Performed by: PHYSICAL MEDICINE & REHABILITATION

## 2023-11-06 PROCEDURE — 2500000005 HC RX 250 GENERAL PHARMACY W/O HCPCS: Performed by: PHYSICAL MEDICINE & REHABILITATION

## 2023-11-06 PROCEDURE — 7100000009 HC PHASE TWO TIME - INITIAL BASE CHARGE

## 2023-11-06 PROCEDURE — 2500000004 HC RX 250 GENERAL PHARMACY W/ HCPCS (ALT 636 FOR OP/ED): Performed by: PHYSICAL MEDICINE & REHABILITATION

## 2023-11-06 PROCEDURE — 7100000010 HC PHASE TWO TIME - EACH INCREMENTAL 1 MINUTE

## 2023-11-06 RX ORDER — LIDOCAINE HYDROCHLORIDE 5 MG/ML
INJECTION, SOLUTION INFILTRATION; PERINEURAL AS NEEDED
Status: COMPLETED | OUTPATIENT
Start: 2023-11-06 | End: 2023-11-06

## 2023-11-06 RX ORDER — DEXAMETHASONE SODIUM PHOSPHATE 100 MG/10ML
INJECTION INTRAMUSCULAR; INTRAVENOUS AS NEEDED
Status: COMPLETED | OUTPATIENT
Start: 2023-11-06 | End: 2023-11-06

## 2023-11-06 RX ORDER — MIDAZOLAM HYDROCHLORIDE 1 MG/ML
INJECTION, SOLUTION INTRAMUSCULAR; INTRAVENOUS AS NEEDED
Status: COMPLETED | OUTPATIENT
Start: 2023-11-06 | End: 2023-11-06

## 2023-11-06 RX ADMIN — MIDAZOLAM 1 MG: 1 INJECTION INTRAMUSCULAR; INTRAVENOUS at 07:58

## 2023-11-06 RX ADMIN — LIDOCAINE HYDROCHLORIDE 5 ML: 5 INJECTION, SOLUTION INFILTRATION; PERINEURAL at 08:01

## 2023-11-06 RX ADMIN — IOHEXOL 10 MG: 240 INJECTION, SOLUTION INTRATHECAL; INTRAVASCULAR; INTRAVENOUS; ORAL at 08:04

## 2023-11-06 RX ADMIN — DEXAMETHASONE SODIUM PHOSPHATE 10 MG: 10 INJECTION INTRAMUSCULAR; INTRAVENOUS at 08:02

## 2023-11-06 ASSESSMENT — COLUMBIA-SUICIDE SEVERITY RATING SCALE - C-SSRS
1. IN THE PAST MONTH, HAVE YOU WISHED YOU WERE DEAD OR WISHED YOU COULD GO TO SLEEP AND NOT WAKE UP?: NO
6. HAVE YOU EVER DONE ANYTHING, STARTED TO DO ANYTHING, OR PREPARED TO DO ANYTHING TO END YOUR LIFE?: NO
2. HAVE YOU ACTUALLY HAD ANY THOUGHTS OF KILLING YOURSELF?: NO

## 2023-11-06 ASSESSMENT — PAIN DESCRIPTION - DESCRIPTORS: DESCRIPTORS: ACHING

## 2023-11-06 ASSESSMENT — PAIN - FUNCTIONAL ASSESSMENT
PAIN_FUNCTIONAL_ASSESSMENT: 0-10

## 2023-11-06 ASSESSMENT — PAIN SCALES - GENERAL
PAINLEVEL_OUTOF10: 4
PAINLEVEL_OUTOF10: 0 - NO PAIN
PAINLEVEL_OUTOF10: 3
PAINLEVEL_OUTOF10: 0 - NO PAIN

## 2023-11-06 NOTE — OP NOTE
* No procedures listed * Operative Note     Date: 2023  OR Location: [facility] [place] SUBASC NON-OR PROCEDURES    Name: Roshni Loomis, : 1965, Age: 58 y.o., MRN: [id number], Sex: female    Diagnosis   Herniated nucleus pulposus, L4-5 (M51.26)  Herniated nucleus pulposus, L4-5 (M51.26)      Procedures  Right L45 Transforaminal epidural steroid injection        Surgeons   Iván Johns MD     Resident/Fellow/Other Assistant:  * No surgeons found in log *    Procedure Summary  Anesthesia: * No anesthesia type entered *  ASA: ASA status not filed in the log.  Anesthesia Staff: No anesthesia staff entered.  Estimated Blood Loss: 0mL  Intra-op Medications: * Intraprocedure medication information is unavailable because the case start and end events have not been set *      Intraprocedure I/O Totals       None           Specimen: No specimens collected     Staff:   Circulator: Diane Moralez RN; Sarah Ramirez RN         Drains and/or Catheters: * None in log *    Tourniquet Times:         Implants:     Findings: End plates changes     Indications: Roshni Loomis is an 58 y.o. female who is having right L$5 Transforaminal epidural steroid injection        The patient was seen in the preoperative area. The risks, benefits, complications, treatment options, non-operative alternatives, expected recovery and outcomes were discussed with the patient. The possibilities of reaction to medication, pulmonary aspiration, injury to surrounding structures, bleeding, recurrent infection, the need for additional procedures, failure to diagnose a condition, and creating a complication requiring transfusion or operation were discussed with the patient. The patient concurred with the proposed plan, giving informed consent.  The site of surgery was properly noted/marked if necessary per policy. The patient has been actively warmed in preoperative area. Preoperative antibiotics are not indicated. Venous thrombosis prophylaxis are not  indicated.    Procedure Details:  Time-in:  7:55 am   Time-out:  8:10 am   Sedation:  1 mg of IV versed   Indications: Failure to respond to conservative treatment with therapy and medications.    PROCEDURE:    The risks, benefits and alternatives of the procedure were discussed with the patient and agreed to proceed. The risks included but not limited to: infection, bleeding, paralysis, nerve injury sepsis and remotely death were discussed with the patient during the office and again in the pre procedure area.  The patient signed informed consent in the pre procedure area.     The patient was brought to procedure room and time out for the procedure was performed with the procedure room staff present. Patient placed in prone position on the procedure table and draped and covered appropriately.      Fluoroscopy machine was used to identify the right L4 L5  neuroforamen    Skin prepped and draped in sterile fashion over the lower lumbar spine area.  Skin was infiltrated with local anesthetic with 0.5% lidocaine.  Spinal needle was introduced to the neuroforamen, verified with fluoroscopy.  Adequate flow of contrast dye around the nerve root was verified with fluoroscopy.  At that point, 10 mg dexamethasone mixed with 5 mL of normal saline were injected.      Procedure tolerated very well.  No complications encountered.  Post procedure care discussed with the patient, agreed to proceed.   Patient instructed on keeping track of the pain level post discharge.   Patient discharged home, from the recovery room, in stable condition.   Complications:  None; patient tolerated the procedure well.    Disposition: PACU - hemodynamically stable.  Condition: stable         Additional Details: as above    Attending Attestation:     Ivná Johns MD

## 2023-11-06 NOTE — INTERVAL H&P NOTE
H&P reviewed. The patient was examined and there are no changes to the H&P.    Denied any fever or chills. No weight loss and no night sweats. No cough or sputum production. No diarrhea   The constipation has been responding to fibers and over the counter medications.     No bladder and bowel incontinence and no other changes in bladder and bowel. No skin changes.  Reports tiredness and fatigability only if the pain is not controlled.   Denied opioids diversion and abuse and denies alcoholism. Denies overuse of the pain medications.

## 2023-11-06 NOTE — DISCHARGE INSTRUCTIONS
Discharge home when Vital signs are stable and back to pre procedure status  Keep injection site covered until next day  May shower today but no bath tub or pool until next day, keep injection site dry until tomorrow. If band aid fall off, no need to replace it.  Please record the level of pain starting today until your follow up.  Call office immediately with any increase in pain or redness or bleeding at injection site.   Please not note that some skin flushing mainly on the face can occur for few days after injection. You may experience some increase in the pain you the injection for. But, if that happens, it should last only a day or two  Call office if you start having any unusual symptoms with headaches, Nausa, Vomitting Diarrhea confusion inability to focus etc...  Call office for appointment in one week

## 2023-11-07 ASSESSMENT — PAIN SCALES - GENERAL: PAINLEVEL_OUTOF10: 0 - NO PAIN

## 2023-11-20 ENCOUNTER — HOSPITAL ENCOUNTER (OUTPATIENT)
Dept: OPERATING ROOM | Facility: CLINIC | Age: 58
Discharge: HOME | End: 2023-11-20
Payer: COMMERCIAL

## 2023-11-20 ENCOUNTER — ANCILLARY PROCEDURE (OUTPATIENT)
Dept: RADIOLOGY | Facility: CLINIC | Age: 58
End: 2023-11-20
Payer: COMMERCIAL

## 2023-11-20 VITALS
TEMPERATURE: 97.9 F | WEIGHT: 171.96 LBS | RESPIRATION RATE: 16 BRPM | HEART RATE: 54 BPM | OXYGEN SATURATION: 99 % | HEIGHT: 64 IN | SYSTOLIC BLOOD PRESSURE: 167 MMHG | DIASTOLIC BLOOD PRESSURE: 80 MMHG | BODY MASS INDEX: 29.36 KG/M2

## 2023-11-20 DIAGNOSIS — M51.26 HERNIATED NUCLEUS PULPOSUS, L4-5: ICD-10-CM

## 2023-11-20 PROCEDURE — 76000 FLUOROSCOPY <1 HR PHYS/QHP: CPT

## 2023-11-20 PROCEDURE — 2550000001 HC RX 255 CONTRASTS: Performed by: PHYSICAL MEDICINE & REHABILITATION

## 2023-11-20 PROCEDURE — 2500000005 HC RX 250 GENERAL PHARMACY W/O HCPCS: Performed by: PHYSICAL MEDICINE & REHABILITATION

## 2023-11-20 PROCEDURE — 7100000010 HC PHASE TWO TIME - EACH INCREMENTAL 1 MINUTE

## 2023-11-20 PROCEDURE — 7100000009 HC PHASE TWO TIME - INITIAL BASE CHARGE

## 2023-11-20 PROCEDURE — 64483 NJX AA&/STRD TFRM EPI L/S 1: CPT | Performed by: PHYSICAL MEDICINE & REHABILITATION

## 2023-11-20 PROCEDURE — 96372 THER/PROPH/DIAG INJ SC/IM: CPT | Performed by: PHYSICAL MEDICINE & REHABILITATION

## 2023-11-20 PROCEDURE — 2500000004 HC RX 250 GENERAL PHARMACY W/ HCPCS (ALT 636 FOR OP/ED): Performed by: PHYSICAL MEDICINE & REHABILITATION

## 2023-11-20 RX ORDER — FENTANYL CITRATE 50 UG/ML
INJECTION, SOLUTION INTRAMUSCULAR; INTRAVENOUS AS NEEDED
Status: COMPLETED | OUTPATIENT
Start: 2023-11-20 | End: 2023-11-20

## 2023-11-20 RX ORDER — DEXAMETHASONE SODIUM PHOSPHATE 100 MG/10ML
INJECTION INTRAMUSCULAR; INTRAVENOUS AS NEEDED
Status: COMPLETED | OUTPATIENT
Start: 2023-11-20 | End: 2023-11-20

## 2023-11-20 RX ORDER — LIDOCAINE HYDROCHLORIDE 5 MG/ML
INJECTION, SOLUTION INFILTRATION; PERINEURAL AS NEEDED
Status: COMPLETED | OUTPATIENT
Start: 2023-11-20 | End: 2023-11-20

## 2023-11-20 RX ADMIN — LIDOCAINE HYDROCHLORIDE 5 ML: 5 INJECTION, SOLUTION INFILTRATION; PERINEURAL at 09:04

## 2023-11-20 RX ADMIN — DEXAMETHASONE SODIUM PHOSPHATE 10 MG: 10 INJECTION INTRAMUSCULAR; INTRAVENOUS at 09:06

## 2023-11-20 RX ADMIN — FENTANYL CITRATE 50 MCG: 50 INJECTION, SOLUTION INTRAMUSCULAR; INTRAVENOUS at 09:00

## 2023-11-20 RX ADMIN — IOHEXOL 240 MG: 240 INJECTION, SOLUTION INTRATHECAL; INTRAVASCULAR; INTRAVENOUS; ORAL at 09:07

## 2023-11-20 ASSESSMENT — COLUMBIA-SUICIDE SEVERITY RATING SCALE - C-SSRS
2. HAVE YOU ACTUALLY HAD ANY THOUGHTS OF KILLING YOURSELF?: NO
1. IN THE PAST MONTH, HAVE YOU WISHED YOU WERE DEAD OR WISHED YOU COULD GO TO SLEEP AND NOT WAKE UP?: NO
6. HAVE YOU EVER DONE ANYTHING, STARTED TO DO ANYTHING, OR PREPARED TO DO ANYTHING TO END YOUR LIFE?: NO

## 2023-11-20 ASSESSMENT — PAIN SCALES - GENERAL
PAINLEVEL_OUTOF10: 3

## 2023-11-20 ASSESSMENT — PAIN - FUNCTIONAL ASSESSMENT
PAIN_FUNCTIONAL_ASSESSMENT: 0-10
PAIN_FUNCTIONAL_ASSESSMENT: 0-10

## 2023-11-20 NOTE — H&P
Still have right lumbar radic  No changes in previous history and PHYSICAL EXAM    Denied any fever or chills. No weight loss and no night sweats. No cough or sputum production. No diarrhea   The constipation has been responding to fibers and over the counter medications.     No bladder and bowel incontinence and no other changes in bladder and bowel. No skin changes.  Reports tiredness and fatigability only if the pain is not controlled.   Denied opioids diversion and abuse and denies alcoholism. Denies overuse of the pain medications.

## 2023-11-20 NOTE — OP NOTE
* No procedures listed * Operative Note     Date: 2023  OR Location: [facility] [place] [facility]ASC NON-OR PROCEDURES    Name: Roshni Loomis, : 1965, Age: 58 y.o., MRN: [id number], Sex: female    Diagnosis  Herniated nucleus pulposus, L4-5 (M51.26)  Herniated nucleus pulposus, L4-5 (M51.26)      Procedures  Right L45 Transforaminal epidural steroid injection        Surgeons   Iván Johns MD     Resident/Fellow/Other Assistant:  * No surgeons found in log *    Procedure Summary  Anesthesia:  local with 50 mcg of IV fentanyl  ASA: ASA status not filed in the log.  Anesthesia Staff: No anesthesia staff entered.  Estimated Blood Loss: 0 mL  Intra-op Medications: * Intraprocedure medication information is unavailable because the case start and end events have not been set *      Intraprocedure I/O Totals       None           Specimen: No specimens collected     Staff:   Circulator: Mery Shaw RN  Scrub Person: Ivelisse Lovett RN         Drains and/or Catheters: * None in log *    Tourniquet Times:         Implants:     Findings:  Herniated nucleus pulposus, L4-5 (M51.26)     Indications: Roshni Loomis is an 58 y.o. female who is having right L4L5 Transforaminal epidural steroid injection        The patient was seen in the preoperative area. The risks, benefits, complications, treatment options, non-operative alternatives, expected recovery and outcomes were discussed with the patient. The possibilities of reaction to medication, pulmonary aspiration, injury to surrounding structures, bleeding, recurrent infection, the need for additional procedures, failure to diagnose a condition, and creating a complication requiring transfusion or operation were discussed with the patient. The patient concurred with the proposed plan, giving informed consent.  The site of surgery was properly noted/marked if necessary per policy. The patient has been actively warmed in preoperative area. Preoperative antibiotics are not indicated.  Venous thrombosis prophylaxis are not indicated.    Procedure Details:  Time-in:  8:59 a   Time-out:  9:12 a  Sedation:  50 mcg of IV fentanyl   Indications: Failure to respond to conservative treatment with therapy and medications.    PROCEDURE:    The risks, benefits and alternatives of the procedure were discussed with the patient and agreed to proceed. The risks included but not limited to: infection, bleeding, paralysis, nerve injury sepsis and remotely death were discussed with the patient during the office and again in the pre procedure area.  The patient signed informed consent in the pre procedure area.     The patient was brought to procedure room and time out for the procedure was performed with the procedure room staff present. Patient placed in prone position on the procedure table and draped and covered appropriately.      Fluoroscopy machine was used to identify the right L4L5  neuroforamen    Skin prepped and draped in sterile fashion over the lower lumbar spine area.  Skin was infiltrated with local anesthetic with 0.5% lidocaine.  Spinal needle was introduced to the neuroforamen, verified with fluoroscopy.  Adequate flow of contrast dye around the nerve root was verified with fluoroscopy.  At that point, 10 mg dexamethasone mixed with 5 mL of normal saline were injected.      Procedure tolerated very well.  No complications encountered.  Post procedure care discussed with the patient, agreed to proceed.   Patient instructed on keeping track of the pain level post discharge.   Patient discharged home, from the recovery room, in stable condition.   Complications:  None; patient tolerated the procedure well.    Disposition: PACU - hemodynamically stable.  Condition: stable         Additional Details: as above    Attending Attestation: I performed the procedure.    Iván Johns MD

## 2023-12-12 ENCOUNTER — OFFICE VISIT (OUTPATIENT)
Dept: PAIN MEDICINE | Facility: CLINIC | Age: 58
End: 2023-12-12
Payer: COMMERCIAL

## 2023-12-12 DIAGNOSIS — M51.26 HERNIATED NUCLEUS PULPOSUS, L3-4: ICD-10-CM

## 2023-12-12 DIAGNOSIS — Z79.891 LONG TERM CURRENT USE OF OPIATE ANALGESIC: ICD-10-CM

## 2023-12-12 DIAGNOSIS — M51.26 HERNIATED NUCLEUS PULPOSUS, L4-5: ICD-10-CM

## 2023-12-12 DIAGNOSIS — M51.26 HERNIATED NUCLEUS PULPOSUS, L2-3: Primary | ICD-10-CM

## 2023-12-12 DIAGNOSIS — M22.42 CHONDROMALACIA OF LEFT PATELLA: ICD-10-CM

## 2023-12-12 PROCEDURE — 99214 OFFICE O/P EST MOD 30 MIN: CPT | Performed by: PHYSICAL MEDICINE & REHABILITATION

## 2023-12-12 RX ORDER — HYDROCODONE BITARTRATE AND ACETAMINOPHEN 7.5; 325 MG/1; MG/1
1 TABLET ORAL EVERY 8 HOURS PRN
Qty: 84 TABLET | Refills: 0 | Status: SHIPPED | OUTPATIENT
Start: 2024-01-20 | End: 2024-02-13 | Stop reason: SDUPTHER

## 2023-12-12 RX ORDER — HYDROCODONE BITARTRATE AND ACETAMINOPHEN 7.5; 325 MG/1; MG/1
1 TABLET ORAL EVERY 8 HOURS PRN
Qty: 84 TABLET | Refills: 0 | Status: SHIPPED | OUTPATIENT
Start: 2023-12-23 | End: 2024-01-20

## 2023-12-12 NOTE — PROGRESS NOTES
[facility]  Claim [id number]   D.O.I 06.30.2005     · Herniated nucleus pulposus, L2-3 M51.26)   · Herniated nucleus pulposus, L3-4 (M51.26)   · Herniated nucleus pulposus, L4-5 (M51.26)   . Chondromalacia left knee M22.42      Chief complaint  Back and lower limbs pain   Left knee pain     History  Back and leg pain . The pain in the back is deep achy worse in the mid back area.  This is associated with tight muscle bands.  This limits the range of motion of the lumbar spine mainly in forward flexion.  The pain in the back is radiating around the side on the lateral aspect of the right thigh going down toward the knee and then going medially over the right shin to the medial malleolus toward the dorsum of the right foot.   This pain down to the right lower limb is more of a burning tingling sensation.  The pain in the right lower limb worsens with bending forward or with any lifting, it improves with laying on the side and resting.  This is similar to the associated pain in the middle to lower back.  Denied any bowel or bladder incontinence.  With the worst pain there is tendency to drag the right foot and difficulty picking up the toes specially if tired or after a long day.    The skin is intact with no breakdown.  No vesicles.     The pain is better after the DEB     Left knee is coming back. The pain in the left  knee is deep achy stabbing.  It is both on the medial and lateral aspects and behind the kneecap.  The knee pain is associated with sensation of crunching upon range of motion and weightbearing.  The pain is continuous at rest associated with stiffness with prolonged sitting and get worse with standing walking or any weightbearing activity.  Occasionally there is knee swelling.  No change in color or texture of the skin.  No pain proximal to the thigh or distal to the leg associated with the knee pain.  With episodes of aggravation of the pain there are occasion of sensation of instability but no falls.        Pain level without medication is 8/10 , with the medication pain level 5/10.     The pain meds are helping control the pain and improving Activities of Daily living and quality of life and quality of sleep.    opioids treatment agreement 2023  Oarrs pulled and scanned in the chart  no concerns  last urine toxicology testing earlier this year and it was compliant we will repeat  Xray updated spine   ORT Score is  0  Pain pathology and pain generators spine and knee   Modalities tried injection, surgery, physical therapy, TENS unit, nonsteroidal anti-inflammatory medication       Denied any fever or chills. No weight loss and no night sweats. No cough or sputum production. No diarrhea   The constipation has been responding to fibers and over the counter medications.     No bladder and bowel incontinence and no other changes in bladder and bowel. No skin changes.  Reports tiredness and fatigability only if the pain is not controlled.   Denied opioids diversion and abuse and denies alcoholism. Denies overuse of the pain medications.    The control of the pain with the pain medications is helping the control of the symptoms and allowing the function and activities of daily living, enjoyment of life, improving the quality of life and sleep with less interruption by the pain. The goal is symptomatic control of the nonmalignant chronic pain and not to repair the permanent damage in the tissues inducing the chronic pain conditions. We are aiming to shift the focus from the nonmalignant chronic pain to other aspects of life by symptomatically treating this chronic pain. If this pain is not treated it will lead to major morbidity and it is also associated with increased risks of mortality. The patient understands those very clearly and also understand high risks of morbidity and mortality if not strictly adherent to the treatment recommendations and reporting any associated side effects. Also patient understand the full  responsibility associated with these medications to avoid abuse or overuse or any use of these medications for anything besides treating the patient's own chronic pain and nothing else under any circumstances.        Physical examination  Awake, alert and oriented for time place and persons   declined Chaperone for the visit and was adequately  draped for the exam.      Examination of the left knee showed mild clinical effusion. Normal skin color and texture palpation of the knee showed tenderness over the medial and lateral joint line and the sensation of crepitation upon range of motion.  Range of motion from -2 degrees to 105 degrees. No medial lateral instability. No drawer sign.  Lachman is negative.  Positive Glen both medially and laterally.  Negative pivot shift.  Homans' sign is negative.  Calves are soft.  Knee flexion and extension is 4/5 and limited by the pain.     Diagnosis  Problem List Items Addressed This Visit       Chondromalacia of left patella    Herniated nucleus pulposus, L2-3 - Primary    Relevant Medications    HYDROcodone-acetaminophen (Norco) 7.5-325 mg tablet (Start on 12/23/2023)    HYDROcodone-acetaminophen (Norco) 7.5-325 mg tablet (Start on 1/20/2024)    Herniated nucleus pulposus, L4-5    Herniated nucleus pulposus, L3-4    Relevant Medications    HYDROcodone-acetaminophen (Norco) 7.5-325 mg tablet (Start on 12/23/2023)    HYDROcodone-acetaminophen (Norco) 7.5-325 mg tablet (Start on 1/20/2024)    Long term current use of opiate analgesic    Relevant Orders    Opiate/Opioid/Benzo Prescription Compliance        Plan  Reviewed the pain generators.  Went over the types of pain with neuropathic and nociceptive and different pathologies and therapeutic modalities. Discussed the mechanism of action of interventions from acupuncture, physical therapy , regular exercises, injections, botox, spinal cord stimulation, and role of surgery     Went over pathology of the intervertebral disc  displacement and the anatomical relation to the Nerve roots and relation to the radicular symptoms. Went over treatment modalities with conservative treatment including acupuncture   and epidural steroid injection with fluoroscopy guidance and last resort of surgery    Based on the above findings and the clinical response to the opioids medications and improvement of the activities of daily living, sleep, and work performance. We made this complex decision to continue the opioids therapy in light of the evidence of the patient's responsibility in using the pain medications as prescribed for the nonmalignant chronic pain condition. We discussed about the use of the pain medications to treat the symptoms of chronic nonmalignant pain and we are not trying the repair the permanent damage in the tissues, rather we are trying to control the symptoms induced by the permanent damage to the tissues inducing the chronic pain condition and resulting disability. I explained the difference and discussed it with the patient and stressed the importance of knowing the difference especially because of the potential side effects and the potential addicting effect and habit forming nature of the dangerous drugs we are using to treat the symptoms of the chronic pain.      We discussed that we are prescribing the medications on good barber and legitimate medical reason.     We reviewed the side effects and precautions of opioids prescriptions as discussed in the opioids treatment agreement.    realizes the interaction between the therapeutic classes including the respiratory depression and potential death     Random drug testing twice in 6 months we will submit     Hhyrocodone TID     Discussed about NSAIDS and I explained about the opioids sparing effect to allow keeping the opioids dose at minimal effective dose.   I went over the potential side effects of the NSAIDS on the gastrointestinal, renal and cardiovascular systems.      I  detailed the side effects from the acetaminophen in the medication and made aware of those. I also explained about the cumulative effects on the organs and mainly the liver.     Given the opioids therapy , we discussed about the risk for accidental over dose on the pain medications, either for patient or other household. I went over the mechanism of action and mode of use of the Naloxone according to the  recommendations. I will provide a prescription for a kit.     Follow-up 8 weeks or earlier if needed     The level of clinical decision making in this office visit,  is high, given the high risks of complications with the morbidity and mortality due to the fact that acute and chronic pain may pose a threat to life and bodily function, if under treated, poorly treated, or with failure to maintain adequate treatment and timely medical follow up. Additionally over treatment has its own set of complications including overdosing on the pain medications and also the habit forming potentials with the use of the medications used to treat chronic painful conditions including therapeutic classes classified as dangerous medications. Given the serious and fluctuating nature of pain level and instensity with extensive consideration for whenever pain changes, there is always the risk of prolonged functional impairment requiring close patient monitoring with regular assessments and reassessments and high level medical decision making at every office visit. The amount and complexity of data reviewed is high given the patient clinical presentation, labs,  data, radiology reports, and other tests as discussed during office visits. Pertinent data whether positive or negative were taken in consideration in the process of making this high level medical decision.

## 2023-12-19 ENCOUNTER — TELEMEDICINE (OUTPATIENT)
Dept: PRIMARY CARE | Facility: CLINIC | Age: 58
End: 2023-12-19
Payer: COMMERCIAL

## 2023-12-19 DIAGNOSIS — J01.10 ACUTE NON-RECURRENT FRONTAL SINUSITIS: Primary | ICD-10-CM

## 2023-12-19 PROCEDURE — 99213 OFFICE O/P EST LOW 20 MIN: CPT | Performed by: INTERNAL MEDICINE

## 2023-12-19 RX ORDER — AMOXICILLIN AND CLAVULANATE POTASSIUM 875; 125 MG/1; MG/1
875 TABLET, FILM COATED ORAL 2 TIMES DAILY
Qty: 10 TABLET | Refills: 0 | Status: SHIPPED | OUTPATIENT
Start: 2023-12-19 | End: 2023-12-24

## 2023-12-19 ASSESSMENT — ENCOUNTER SYMPTOMS
HEMATOLOGIC/LYMPHATIC NEGATIVE: 1
RHINORRHEA: 1
NEUROLOGICAL NEGATIVE: 1
CONSTITUTIONAL NEGATIVE: 1
ALLERGIC/IMMUNOLOGIC NEGATIVE: 1
ENDOCRINE NEGATIVE: 1
RESPIRATORY NEGATIVE: 1
PSYCHIATRIC NEGATIVE: 1
CARDIOVASCULAR NEGATIVE: 1
GASTROINTESTINAL NEGATIVE: 1
MUSCULOSKELETAL NEGATIVE: 1

## 2023-12-19 NOTE — PROGRESS NOTES
Subjective   Patient ID: Roshni Loomis is a 58 y.o. female.    HPI  A 58 Y.O Female with past medical history of SLE, [place] back pain had a virtual call today. Patient states that she has been having runny nose for 2 weeks now. She associate symptoms with frontal headache and cough. Patient states that she tried nasal spray but has not noticed any improvement. Patient denies fever, chest pain, nausea, vomiting, dizziness, leg pain or leg swelling  Review of Systems   Constitutional: Negative.    HENT:  Positive for rhinorrhea.    Respiratory: Negative.     Cardiovascular: Negative.    Gastrointestinal: Negative.    Endocrine: Negative.    Genitourinary: Negative.    Musculoskeletal: Negative.    Allergic/Immunologic: Negative.    Neurological: Negative.    Hematological: Negative.    Psychiatric/Behavioral: Negative.         Objective   Unable to perform physical exam due to virtual call    Assessment/Plan   Diagnoses and all orders for this visit:  Acute non-recurrent frontal sinusitis  -     amoxicillin-pot clavulanate (Augmentin) 875-125 mg tablet; Take 1 tablet (875 mg) by mouth 2 times a day for 5 days.

## 2023-12-26 ENCOUNTER — PHARMACY VISIT (OUTPATIENT)
Dept: PHARMACY | Facility: CLINIC | Age: 58
End: 2023-12-26
Payer: COMMERCIAL

## 2023-12-26 PROCEDURE — RXMED WILLOW AMBULATORY MEDICATION CHARGE

## 2024-01-25 ENCOUNTER — TELEPHONE (OUTPATIENT)
Dept: OBSTETRICS AND GYNECOLOGY | Facility: CLINIC | Age: 59
End: 2024-01-25
Payer: COMMERCIAL

## 2024-01-25 DIAGNOSIS — N89.8 VAGINAL DRYNESS: ICD-10-CM

## 2024-01-25 RX ORDER — ESTRADIOL 10 UG/1
10 INSERT VAGINAL NIGHTLY
Qty: 18 TABLET | Refills: 1 | Status: SHIPPED | OUTPATIENT
Start: 2024-01-25 | End: 2024-03-05 | Stop reason: ALTCHOICE

## 2024-02-08 ENCOUNTER — LAB (OUTPATIENT)
Dept: LAB | Facility: LAB | Age: 59
End: 2024-02-08
Payer: COMMERCIAL

## 2024-02-08 DIAGNOSIS — Z79.891 LONG TERM CURRENT USE OF OPIATE ANALGESIC: ICD-10-CM

## 2024-02-08 LAB
AMPHETAMINES UR QL SCN: NORMAL
BARBITURATES UR QL SCN: NORMAL
BZE UR QL SCN: NORMAL
CANNABINOIDS UR QL SCN: NORMAL
CREAT UR-MCNC: 140.5 MG/DL (ref 20–320)
PCP UR QL SCN: NORMAL

## 2024-02-08 PROCEDURE — 80368 SEDATIVE HYPNOTICS: CPT

## 2024-02-08 PROCEDURE — 80365 DRUG SCREENING OXYCODONE: CPT

## 2024-02-08 PROCEDURE — 80346 BENZODIAZEPINES1-12: CPT

## 2024-02-08 PROCEDURE — 82570 ASSAY OF URINE CREATININE: CPT

## 2024-02-08 PROCEDURE — 80358 DRUG SCREENING METHADONE: CPT

## 2024-02-08 PROCEDURE — 80361 OPIATES 1 OR MORE: CPT

## 2024-02-08 PROCEDURE — 80373 DRUG SCREENING TRAMADOL: CPT

## 2024-02-08 PROCEDURE — 80354 DRUG SCREENING FENTANYL: CPT

## 2024-02-08 PROCEDURE — 80307 DRUG TEST PRSMV CHEM ANLYZR: CPT

## 2024-02-13 ENCOUNTER — OFFICE VISIT (OUTPATIENT)
Dept: PAIN MEDICINE | Facility: CLINIC | Age: 59
End: 2024-02-13
Payer: COMMERCIAL

## 2024-02-13 DIAGNOSIS — M51.27 OTHER INTERVERTEBRAL DISC DISPLACEMENT, LUMBOSACRAL REGION: ICD-10-CM

## 2024-02-13 DIAGNOSIS — M51.26 HERNIATED NUCLEUS PULPOSUS, L4-5: ICD-10-CM

## 2024-02-13 DIAGNOSIS — M51.26 HERNIATED NUCLEUS PULPOSUS, L2-3: Primary | ICD-10-CM

## 2024-02-13 DIAGNOSIS — M51.26 HERNIATED NUCLEUS PULPOSUS, L3-4: ICD-10-CM

## 2024-02-13 PROCEDURE — 99214 OFFICE O/P EST MOD 30 MIN: CPT | Performed by: PHYSICAL MEDICINE & REHABILITATION

## 2024-02-13 RX ORDER — HYDROCODONE BITARTRATE AND ACETAMINOPHEN 7.5; 325 MG/1; MG/1
1 TABLET ORAL EVERY 8 HOURS PRN
Qty: 84 TABLET | Refills: 0 | Status: SHIPPED | OUTPATIENT
Start: 2024-02-13 | End: 2024-03-12

## 2024-02-13 RX ORDER — HYDROCODONE BITARTRATE AND ACETAMINOPHEN 7.5; 325 MG/1; MG/1
1 TABLET ORAL EVERY 8 HOURS PRN
Qty: 84 TABLET | Refills: 0 | Status: SHIPPED | OUTPATIENT
Start: 2024-03-12 | End: 2024-04-08 | Stop reason: SDUPTHER

## 2024-02-13 NOTE — PROGRESS NOTES
[facility]  Claim [id number]   D.O.I 06.30.2005     · Herniated nucleus pulposus, L2-3 M51.26)   · Herniated nucleus pulposus, L3-4 (M51.26)   · Herniated nucleus pulposus, L4-5 (M51.26)   . Chondromalacia left knee M22.42    Chief complaint  Back and lower limbs pain   Left knee pain    History  Ms Loomis is back for pain mgmt visit  DEB from November controlled pain by 90% until last week. That 3.5 months. She wants to consider repeat injection but wants to hold off for now by increasing the HEP  Pain in the l knee did well after intraarticular steroid injection and now the pain is slowly coming back. Also wants to hold off injection for now  Examination of the left knee showed mild clinical effusion. Normal skin color and texture palpation of the knee showed tenderness over the medial and lateral joint line and the sensation of crepitation upon range of motion.  Range of motion from -2 degrees to 105 degrees. No medial lateral instability. No drawer sign.  Lachman is negative.  Positive Glen both medially and laterally.  Negative pivot shift.  Homans' sign is negative.  Calves are soft.  Knee flexion and extension is 4/5 and limited by the pain.     Meds controllign the pain and allowing her to function and does ADL and continues to work      Pain level without medication is 8/10 , with the medication pain level 3/10.     The pain meds are helping control the pain and improving Activities of Daily living and quality of life and quality of sleep.    opioids treatment agreement Feb 2024  PDI (Pain Disability Index) score: 49   Oarrs pulled and scanned in the chart  no concerns  last urine toxicology testing earlier this year and it was compliant we will repeat  Xray updated spine and knee   ORT Score is  0  Pain pathology and pain generators spine and knee   Modalities tried injection, surgery, physical therapy, TENS unit, nonsteroidal anti-inflammatory medication       Denied any fever or chills. No weight loss and  no night sweats. No cough or sputum production. No diarrhea   The constipation has been responding to fibers and over the counter medications.     No bladder and bowel incontinence and no other changes in bladder and bowel. No skin changes.  Reports tiredness and fatigability only if the pain is not controlled.   Denied opioids diversion and abuse and denies alcoholism. Denies overuse of the pain medications.    The control of the pain with the pain medications is helping the control of the symptoms and allowing the function and activities of daily living, enjoyment of life, improving the quality of life and sleep with less interruption by the pain. The goal is symptomatic control of the nonmalignant chronic pain and not to repair the permanent damage in the tissues inducing the chronic pain conditions. We are aiming to shift the focus from the nonmalignant chronic pain to other aspects of life by symptomatically treating this chronic pain. If this pain is not treated it will lead to major morbidity and it is also associated with increased risks of mortality. The patient understands those very clearly and also understand high risks of morbidity and mortality if not strictly adherent to the treatment recommendations and reporting any associated side effects. Also patient understand the full responsibility associated with these medications to avoid abuse or overuse or any use of these medications for anything besides treating the patient's own chronic pain and nothing else under any circumstances.        Physical examination  Awake, alert and oriented for time place and persons   declined Chaperone for the visit and was adequately  draped for the exam.      Examination of the left knee showed mild clinical effusion. Normal skin color and texture palpation of the knee showed tenderness over the medial and lateral joint line and the sensation of crepitation upon range of motion.  Range of motion from -2 degrees to 105  degrees. No medial lateral instability. No drawer sign.  Lachman is negative.  Positive Glen both medially and laterally.  Negative pivot shift.  Homans' sign is negative.  Calves are soft.  Knee flexion and extension is 4/5 and limited by the pain.     Mary negative   Slr increased pain in back  Pain inhibition of the hips Manual Muscle strength testing because of the pain at the lower back of and over SIJ. the endurance is decreased even though the initial resistance was 5/5 but could not keep beyond initial resistance.      Diagnosis  Problem List Items Addressed This Visit       Herniated nucleus pulposus, L2-3 - Primary    Relevant Medications    HYDROcodone-acetaminophen (Norco) 7.5-325 mg tablet    HYDROcodone-acetaminophen (Norco) 7.5-325 mg tablet (Start on 3/12/2024)    Herniated nucleus pulposus, L4-5    Herniated nucleus pulposus, L3-4    Relevant Medications    HYDROcodone-acetaminophen (Norco) 7.5-325 mg tablet    HYDROcodone-acetaminophen (Norco) 7.5-325 mg tablet (Start on 3/12/2024)    Other intervertebral disc displacement, lumbosacral region        Plan  Reviewed the pain generators.  Went over the types of pain with neuropathic and nociceptive and different pathologies and therapeutic modalities. Discussed the mechanism of action of interventions from acupuncture, physical therapy , regular exercises, injections, botox, spinal cord stimulation, and role of surgery     Went over pathology of the intervertebral disc displacement and the anatomical relation to the Nerve roots and relation to the radicular symptoms. Went over treatment modalities with conservative treatment including acupuncture   and epidural steroid injection with fluoroscopy guidance and last resort of surgery    Based on the above findings and the clinical response to the opioids medications and improvement of the activities of daily living, sleep, and work performance. We made this complex decision to continue the opioids  therapy in light of the evidence of the patient's responsibility in using the pain medications as prescribed for the nonmalignant chronic pain condition. We discussed about the use of the pain medications to treat the symptoms of chronic nonmalignant pain and we are not trying the repair the permanent damage in the tissues, rather we are trying to control the symptoms induced by the permanent damage to the tissues inducing the chronic pain condition and resulting disability. I explained the difference and discussed it with the patient and stressed the importance of knowing the difference especially because of the potential side effects and the potential addicting effect and habit forming nature of the dangerous drugs we are using to treat the symptoms of the chronic pain.      We discussed that we are prescribing the medications on good barber and legitimate medical reason.     We reviewed the side effects and precautions of opioids prescriptions as discussed in the opioids treatment agreement.    realizes the interaction between the therapeutic classes including the respiratory depression and potential death     Random drug testing twice in 6 months we will submit     Hydrocodone 7.5 mg po tid  has a narcan at home know how and when to use it if needed.   Conside injeciton if further agg of pain     Discussed about NSAIDS and I explained about the opioids sparing effect to allow keeping the opioids dose at minimal effective dose.   I went over the potential side effects of the NSAIDS on the gastrointestinal, renal and cardiovascular systems.      I detailed the side effects from the acetaminophen in the medication and made aware of those. I also explained about the cumulative effects on the organs and mainly the liver.     Given the opioids therapy , we discussed about the risk for accidental over dose on the pain medications, either for patient or other household. I went over the mechanism of action and mode of use  of the Naloxone according to the  recommendations. I will provide a prescription for a kit.     Follow-up 8 weeks or earlier if needed     The level of clinical decision making in this office visit,  is high, given the high risks of complications with the morbidity and mortality due to the fact that acute and chronic pain may pose a threat to life and bodily function, if under treated, poorly treated, or with failure to maintain adequate treatment and timely medical follow up. Additionally over treatment has its own set of complications including overdosing on the pain medications and also the habit forming potentials with the use of the medications used to treat chronic painful conditions including therapeutic classes classified as dangerous medications. Given the serious and fluctuating nature of pain level and instensity with extensive consideration for whenever pain changes, there is always the risk of prolonged functional impairment requiring close patient monitoring with regular assessments and reassessments and high level medical decision making at every office visit. The amount and complexity of data reviewed is high given the patient clinical presentation, labs,  data, radiology reports, and other tests as discussed during office visits. Pertinent data whether positive or negative were taken in consideration in the process of making this high level medical decision.

## 2024-02-16 LAB
1OH-MIDAZOLAM UR CFM-MCNC: <25 NG/ML
6MAM UR CFM-MCNC: <25 NG/ML
7AMINOCLONAZEPAM UR CFM-MCNC: <25 NG/ML
A-OH ALPRAZ UR CFM-MCNC: <25 NG/ML
ALPRAZ UR CFM-MCNC: <25 NG/ML
CHLORDIAZEP UR CFM-MCNC: <25 NG/ML
CLONAZEPAM UR CFM-MCNC: <25 NG/ML
CODEINE UR CFM-MCNC: <50 NG/ML
DIAZEPAM UR CFM-MCNC: <25 NG/ML
EDDP UR CFM-MCNC: <25 NG/ML
FENTANYL UR CFM-MCNC: <2.5 NG/ML
HYDROCODONE CTO UR CFM-MCNC: 115 NG/ML
HYDROMORPHONE UR CFM-MCNC: 44 NG/ML
LORAZEPAM UR CFM-MCNC: <25 NG/ML
METHADONE UR CFM-MCNC: <25 NG/ML
MIDAZOLAM UR CFM-MCNC: <25 NG/ML
MORPHINE UR CFM-MCNC: <50 NG/ML
NORDIAZEPAM UR CFM-MCNC: <25 NG/ML
NORFENTANYL UR CFM-MCNC: <2.5 NG/ML
NORHYDROCODONE UR CFM-MCNC: 143 NG/ML
NOROXYCODONE UR CFM-MCNC: <25 NG/ML
NORTRAMADOL UR-MCNC: <50 NG/ML
OXAZEPAM UR CFM-MCNC: <25 NG/ML
OXYCODONE UR CFM-MCNC: <25 NG/ML
OXYMORPHONE UR CFM-MCNC: <25 NG/ML
TEMAZEPAM UR CFM-MCNC: <25 NG/ML
TRAMADOL UR CFM-MCNC: <50 NG/ML
ZOLPIDEM UR CFM-MCNC: <25 NG/ML
ZOLPIDEM UR-MCNC: <25 NG/ML

## 2024-02-29 ENCOUNTER — PROCEDURE VISIT (OUTPATIENT)
Dept: OBSTETRICS AND GYNECOLOGY | Facility: CLINIC | Age: 59
End: 2024-02-29
Payer: COMMERCIAL

## 2024-02-29 VITALS
WEIGHT: 181 LBS | SYSTOLIC BLOOD PRESSURE: 120 MMHG | RESPIRATION RATE: 12 BRPM | DIASTOLIC BLOOD PRESSURE: 78 MMHG | BODY MASS INDEX: 30.6 KG/M2

## 2024-02-29 DIAGNOSIS — Z12.11 SCREENING FOR COLON CANCER: ICD-10-CM

## 2024-02-29 DIAGNOSIS — N95.1 VAGINAL DRYNESS, MENOPAUSAL: ICD-10-CM

## 2024-02-29 DIAGNOSIS — Z01.419 WELL WOMAN EXAM: Primary | ICD-10-CM

## 2024-02-29 DIAGNOSIS — Z12.31 ENCOUNTER FOR SCREENING MAMMOGRAM FOR MALIGNANT NEOPLASM OF BREAST: ICD-10-CM

## 2024-02-29 PROCEDURE — 99396 PREV VISIT EST AGE 40-64: CPT | Performed by: ADVANCED PRACTICE MIDWIFE

## 2024-02-29 SDOH — ECONOMIC STABILITY: FOOD INSECURITY: WITHIN THE PAST 12 MONTHS, THE FOOD YOU BOUGHT JUST DIDN'T LAST AND YOU DIDN'T HAVE MONEY TO GET MORE.: NEVER TRUE

## 2024-02-29 SDOH — ECONOMIC STABILITY: FOOD INSECURITY: WITHIN THE PAST 12 MONTHS, YOU WORRIED THAT YOUR FOOD WOULD RUN OUT BEFORE YOU GOT MONEY TO BUY MORE.: NEVER TRUE

## 2024-02-29 SDOH — ECONOMIC STABILITY: HOUSING INSECURITY
IN THE LAST 12 MONTHS, WAS THERE A TIME WHEN YOU DID NOT HAVE A STEADY PLACE TO SLEEP OR SLEPT IN A SHELTER (INCLUDING NOW)?: NO

## 2024-02-29 SDOH — ECONOMIC STABILITY: INCOME INSECURITY: IN THE LAST 12 MONTHS, WAS THERE A TIME WHEN YOU WERE NOT ABLE TO PAY THE MORTGAGE OR RENT ON TIME?: NO

## 2024-02-29 ASSESSMENT — PATIENT HEALTH QUESTIONNAIRE - PHQ9
SUM OF ALL RESPONSES TO PHQ9 QUESTIONS 1 AND 2: 0
2. FEELING DOWN, DEPRESSED OR HOPELESS: NOT AT ALL
1. LITTLE INTEREST OR PLEASURE IN DOING THINGS: NOT AT ALL

## 2024-02-29 ASSESSMENT — ENCOUNTER SYMPTOMS
NEUROLOGICAL NEGATIVE: 0
RESPIRATORY NEGATIVE: 0
EYES NEGATIVE: 0
CARDIOVASCULAR NEGATIVE: 0
ENDOCRINE NEGATIVE: 0
ALLERGIC/IMMUNOLOGIC NEGATIVE: 0
PSYCHIATRIC NEGATIVE: 0
GASTROINTESTINAL NEGATIVE: 0
HEMATOLOGIC/LYMPHATIC NEGATIVE: 0
MUSCULOSKELETAL NEGATIVE: 0
CONSTITUTIONAL NEGATIVE: 0

## 2024-02-29 ASSESSMENT — LIFESTYLE VARIABLES
HOW OFTEN DO YOU HAVE SIX OR MORE DRINKS ON ONE OCCASION: NEVER
HOW OFTEN DO YOU HAVE A DRINK CONTAINING ALCOHOL: MONTHLY OR LESS
HOW MANY STANDARD DRINKS CONTAINING ALCOHOL DO YOU HAVE ON A TYPICAL DAY: 1 OR 2
AUDIT-C TOTAL SCORE: 1
SKIP TO QUESTIONS 9-10: 1

## 2024-02-29 ASSESSMENT — SOCIAL DETERMINANTS OF HEALTH (SDOH)
WITHIN THE LAST YEAR, HAVE YOU BEEN HUMILIATED OR EMOTIONALLY ABUSED IN OTHER WAYS BY YOUR PARTNER OR EX-PARTNER?: NO
WITHIN THE LAST YEAR, HAVE YOU BEEN KICKED, HIT, SLAPPED, OR OTHERWISE PHYSICALLY HURT BY YOUR PARTNER OR EX-PARTNER?: NO
WITHIN THE LAST YEAR, HAVE YOU BEEN AFRAID OF YOUR PARTNER OR EX-PARTNER?: NO
WITHIN THE LAST YEAR, HAVE TO BEEN RAPED OR FORCED TO HAVE ANY KIND OF SEXUAL ACTIVITY BY YOUR PARTNER OR EX-PARTNER?: NO

## 2024-02-29 ASSESSMENT — PAIN SCALES - GENERAL: PAINLEVEL: 0-NO PAIN

## 2024-02-29 NOTE — PROGRESS NOTES
Assessment/Plan   Problem List Items Addressed This Visit    None  Visit Diagnoses         Codes    Well woman exam    -  Primary Z01.419    Vaginal dryness, menopausal     N95.1            Yvette Booth, APRN-CNM     Subjective   The patient is here for a well woman exam.  She is postmenopausal and denies any PMB  She has been menopausal natural menopause for 5 years. Patient reports she has decreased sugar content and eating more vegetable and whole foods. Exercising regular.     She reports vasomotor symptoms: yes - hot flashes   She reports GSM: yes - vaginal dryness   She is sexually active:  yes - with partner for 30 years    She reports her sexually activity to include penis in vagina yes - with male partner for 30 years    She has issues with lubrication yes -    She reports issue with orgasms yes -    She reports pain with sexual activity no      History of abnormal Pap smear: no  History of LEEP or cryo:  no  Up to date on colon cancer screening:no patient in unsure of last colonoscopy date (normal results) and unable to locate record in epic.   Family history of uterine or ovarian cancer: no  Family history of breast cancer: yes - maternal grandmother  Regular self breast exam: yes  History of abnormal mammogram: no    Do you feel that you manage your stress level well:  yes - music  Do you feel you get at least 6 hours of sleep per night:  no up periodically throughout the night with back pain and hot flashes   Do you feel like you eat a balanced diet:  yes - Patient reports for the last year she has been managing her diet much better  Are you safe in your relationship and at home:  yes -        Review of Systems  Negative except noted in subjective    Objective :  see vital signs and BMI      General:   Alert and oriented x 3   Heart:  Thyroid: Regular rate, rhythm  Euthyroid, normal shape and size   Lungs:  Breast: Clear to auscultation bilaterally  Symmetrical, no skin changes/nipple discharge,  redness, tenderness, no masses palpated bilaterally   Abdomen: Soft, non tender   Vulva: EGBUS normal   Vagina: Atrophic, normal discharge   Cervix: No CMT   Uterus: Normal shape, size   Adnexa: NT bilaterally     Mammogram and colonoscopy ordered.   Discussed Imvexxy will sent to clinical pharmacist and she will reach out. Follow up in 1 year for annual exam.

## 2024-03-05 ENCOUNTER — TELEMEDICINE (OUTPATIENT)
Dept: PHARMACY | Facility: HOSPITAL | Age: 59
End: 2024-03-05
Payer: COMMERCIAL

## 2024-03-05 DIAGNOSIS — N95.1 VAGINAL DRYNESS, MENOPAUSAL: ICD-10-CM

## 2024-04-03 ENCOUNTER — HOSPITAL ENCOUNTER (OUTPATIENT)
Dept: RADIOLOGY | Facility: CLINIC | Age: 59
Discharge: HOME | End: 2024-04-03
Payer: COMMERCIAL

## 2024-04-03 VITALS — BODY MASS INDEX: 30.9 KG/M2 | HEIGHT: 64 IN | WEIGHT: 181 LBS

## 2024-04-03 DIAGNOSIS — Z12.31 ENCOUNTER FOR SCREENING MAMMOGRAM FOR MALIGNANT NEOPLASM OF BREAST: ICD-10-CM

## 2024-04-03 PROCEDURE — 77067 SCR MAMMO BI INCL CAD: CPT | Performed by: RADIOLOGY

## 2024-04-03 PROCEDURE — 77063 BREAST TOMOSYNTHESIS BI: CPT | Performed by: RADIOLOGY

## 2024-04-03 PROCEDURE — 77067 SCR MAMMO BI INCL CAD: CPT

## 2024-04-08 ENCOUNTER — OFFICE VISIT (OUTPATIENT)
Dept: PAIN MEDICINE | Facility: CLINIC | Age: 59
End: 2024-04-08
Payer: COMMERCIAL

## 2024-04-08 DIAGNOSIS — M51.26 HERNIATED NUCLEUS PULPOSUS, L3-4: ICD-10-CM

## 2024-04-08 DIAGNOSIS — M51.26 HERNIATED NUCLEUS PULPOSUS, L4-5: ICD-10-CM

## 2024-04-08 DIAGNOSIS — M22.42 CHONDROMALACIA OF LEFT PATELLA: ICD-10-CM

## 2024-04-08 DIAGNOSIS — M51.26 HERNIATED NUCLEUS PULPOSUS, L2-3: Primary | ICD-10-CM

## 2024-04-08 PROCEDURE — 99214 OFFICE O/P EST MOD 30 MIN: CPT | Performed by: PHYSICAL MEDICINE & REHABILITATION

## 2024-04-08 RX ORDER — HYDROCODONE BITARTRATE AND ACETAMINOPHEN 7.5; 325 MG/1; MG/1
1 TABLET ORAL EVERY 8 HOURS PRN
Qty: 84 TABLET | Refills: 0 | Status: SHIPPED | OUTPATIENT
Start: 2024-05-07 | End: 2024-05-28 | Stop reason: SDUPTHER

## 2024-04-08 RX ORDER — HYDROCODONE BITARTRATE AND ACETAMINOPHEN 7.5; 325 MG/1; MG/1
1 TABLET ORAL EVERY 8 HOURS PRN
Qty: 84 TABLET | Refills: 0 | Status: SHIPPED | OUTPATIENT
Start: 2024-04-09 | End: 2024-05-07

## 2024-04-08 NOTE — PROGRESS NOTES
[facility]  Claim [id number]   D.O.I 06.30.2005     · Herniated nucleus pulposus, L2-3 M51.26)   · Herniated nucleus pulposus, L3-4 (M51.26)   · Herniated nucleus pulposus, L4-5 (M51.26)   . Chondromalacia left knee M22.42      Chief complaint  Back and legs pain . Left knee pain    History  Roshni Loomis is back for pain management office visit  Pain in back and R leg is the worse  The pain in the back is deep achy worse in the mid back area.  This is associated with tight muscle bands.  This limits the range of motion of the lumbar spine mainly in forward flexion.  The pain in the back is radiating to the buttock and continues to the posterior aspect of the right thigh going down behind the right knee to the calf into the sole of the right foot.        This pain down to the right lower limb is more of a burning tingling sensation.  The pain in the right lower limb worsens with bending forward or with any lifting, it improves with laying on the side and resting.  This is similar to the associated pain in the middle to lower back.  Denied any bowel or bladder incontinence.  With the worst pain there weakness with pushing off with the right foot along with tendency of the right ankle to buckle especially if tired or after a long day.      The skin is intact with no breakdown.  No vesicles.  The pain is interfering with activities of daily living, quality of life and quality of sleep. It is limiting the functions and everything takes longer to complete because of the slowing related to the pain. Movements are cautious to avoid aggravation of the symptoms.    She is taking the pain meds as prescribed and these are helping but still having hte agg       Pain level without medication is 8/10 , with the medication pain level 4 to  5 /10 bc of the aggravation.     The pain meds are helping control the pain and improving Activities of Daily living and quality of life and quality of sleep.    opioids treatment agreement Minh   2024     Oarrs pulled and scanned in the chart  no concerns  last urine toxicology testing earlier this year and it was compliant we will repeat  Xray updated spine and knee   ORT Score is  0  Pain pathology and pain generators spine and knee   Modalities tried injection, surgery, physical therapy, TENS unit, nonsteroidal anti-inflammatory medication       Denied any fever or chills. No weight loss and no night sweats. No cough or sputum production. No diarrhea   The constipation has been responding to fibers and over the counter medications.     No bladder and bowel incontinence and no other changes in bladder and bowel. No skin changes.  Reports tiredness and fatigability only if the pain is not controlled.   Denied opioids diversion and abuse and denies alcoholism. Denies overuse of the pain medications.    The control of the pain with the pain medications is helping the control of the symptoms and allowing the function and activities of daily living, enjoyment of life, improving the quality of life and sleep with less interruption by the pain. The goal is symptomatic control of the nonmalignant chronic pain and not to repair the permanent damage in the tissues inducing the chronic pain conditions. We are aiming to shift the focus from the nonmalignant chronic pain to other aspects of life by symptomatically treating this chronic pain. If this pain is not treated it will lead to major morbidity and it is also associated with increased risks of mortality. The patient understands those very clearly and also understand high risks of morbidity and mortality if not strictly adherent to the treatment recommendations and reporting any associated side effects. Also patient understand the full responsibility associated with these medications to avoid abuse or overuse or any use of these medications for anything besides treating the patient's own chronic pain and nothing else under any circumstances.        Physical  examination  Awake, alert and oriented for time place and persons   declined Chaperone for the visit and was adequately  draped for the exam.    Examination of the lumbar spine showed tight muscle bands in the mid and lower back area, this is more pronounced on the right compared to the left.  Additionally, this is inducing mild reversal of the lumbar lordosis with functional scoliosis with right-sided concavity.  This scoliosis corrected with  bending of the lumbar spine.     Straight leg raising increased the pain in the back and down the lateral aspect of the right knee onto the lateral leg to the lateral malleolus and foot.     There is decreased sensory to light touch on the lateral aspect of the thigh and around the right knee going down to the lateral aspect of the leg to the right lateral malleolus into the dorsum of the foot..    Deep tendon reflexes is present for the patellar tendons bilaterally.  Achilles reflexes are present bilaterally and symmetric.    Medial Hamstrings reflex is decreased on the right compared to the left side.  Plantar cutaneous are downgoing.  Ankle dorsiflexion is 5/5 bilaterally.  Plantar flexion of the ankles are 5/5 bilaterally.  Big toe extension is 4/5 on the right compared to 5/5 on the left side.    Negative Tinel's sign over the right peroneal nerve at the fibular neck.  Mary sign for axial loading and global rotation are negative.  No aberrant pain behavior.       Also pain over left knee with positive Glen and prehension sign  Minimal effusion    Diagnosis  Problem List Items Addressed This Visit       Chondromalacia of left patella    Relevant Medications    HYDROcodone-acetaminophen (Norco) 7.5-325 mg tablet (Start on 4/9/2024)    HYDROcodone-acetaminophen (Norco) 7.5-325 mg tablet (Start on 5/7/2024)    Other Relevant Orders    Transforaminal    Herniated nucleus pulposus, L2-3 - Primary    Relevant Medications    HYDROcodone-acetaminophen (Norco) 7.5-325 mg  tablet (Start on 4/9/2024)    HYDROcodone-acetaminophen (Norco) 7.5-325 mg tablet (Start on 5/7/2024)    Other Relevant Orders    Transforaminal    Herniated nucleus pulposus, L4-5    Relevant Medications    HYDROcodone-acetaminophen (Norco) 7.5-325 mg tablet (Start on 4/9/2024)    HYDROcodone-acetaminophen (Norco) 7.5-325 mg tablet (Start on 5/7/2024)    Other Relevant Orders    Transforaminal    Herniated nucleus pulposus, L3-4    Relevant Medications    HYDROcodone-acetaminophen (Norco) 7.5-325 mg tablet (Start on 4/9/2024)    HYDROcodone-acetaminophen (Norco) 7.5-325 mg tablet (Start on 5/7/2024)    Other Relevant Orders    Transforaminal        Plan  Reviewed the pain generators.  Went over the types of pain with neuropathic and nociceptive and different pathologies and therapeutic modalities. Discussed the mechanism of action of interventions from acupuncture, physical therapy , regular exercises, injections, botox, spinal cord stimulation, and role of surgery     Went over pathology of the intervertebral disc displacement and the anatomical relation to the Nerve roots and relation to the radicular symptoms. Went over treatment modalities with conservative treatment including acupuncture   and epidural steroid injection with fluoroscopy guidance and last resort of surgery    Based on the above findings and the clinical response to the opioids medications and improvement of the activities of daily living, sleep, and work performance. We made this complex decision to continue the opioids therapy in light of the evidence of the patient's responsibility in using the pain medications as prescribed for the nonmalignant chronic pain condition. We discussed about the use of the pain medications to treat the symptoms of chronic nonmalignant pain and we are not trying the repair the permanent damage in the tissues, rather we are trying to control the symptoms induced by the permanent damage to the tissues inducing the  chronic pain condition and resulting disability. I explained the difference and discussed it with the patient and stressed the importance of knowing the difference especially because of the potential side effects and the potential addicting effect and habit forming nature of the dangerous drugs we are using to treat the symptoms of the chronic pain.      We discussed that we are prescribing the medications on good barber and legitimate medical reason.     We reviewed the side effects and precautions of opioids prescriptions as discussed in the opioids treatment agreement.    realizes the interaction between the therapeutic classes including the respiratory depression and potential death     Random drug testing twice in 6 months we will submit     Dw her about pain int eh back and R leg. Consider R L5 Transforaminal epidural steroid injection      Risks not limited to infection, sepsis, abscess, bleeding , nerve injury, paralysis, and death...    Also left knee intraarticular steroid injection will C9 for that    Long discussion about putting effort in cutting back on pain medications. Discussed about pain level changing and we do not know if the medications at this amount are still needed until we try and cut back slowly on pain medications. If the cut is tolerated then we continue. If cut not tolerated then, will go back on the pain medications level.  The goal from this is to keep the pain medications at the lowest effective dose.   Hyrocodone 7.5 mg tid   has a narcan at home know how and when to use it if needed.     Discussed about NSAIDS and I explained about the opioids sparing effect to allow keeping the opioids dose at minimal effective dose.   I went over the potential side effects of the NSAIDS on the gastrointestinal, renal and cardiovascular systems.      I detailed the side effects from the acetaminophen in the medication and made aware of those. I also explained about the cumulative effects on the  organs and mainly the liver.     Given the opioids therapy , we discussed about the risk for accidental over dose on the pain medications, either for patient or other household. I went over the mechanism of action and mode of use of the Naloxone according to the  recommendations. I will provide a prescription for a kit.     Follow-up 8 weeks or earlier if needed     The level of clinical decision making in this office visit,  is high, given the high risks of complications with the morbidity and mortality due to the fact that acute and chronic pain may pose a threat to life and bodily function, if under treated, poorly treated, or with failure to maintain adequate treatment and timely medical follow up. Additionally over treatment has its own set of complications including overdosing on the pain medications and also the habit forming potentials with the use of the medications used to treat chronic painful conditions including therapeutic classes classified as dangerous medications. Given the serious and fluctuating nature of pain level and instensity with extensive consideration for whenever pain changes, there is always the risk of prolonged functional impairment requiring close patient monitoring with regular assessments and reassessments and high level medical decision making at every office visit. The amount and complexity of data reviewed is high given the patient clinical presentation, labs,  data, radiology reports, and other tests as discussed during office visits. Pertinent data whether positive or negative were taken in consideration in the process of making this high level medical decision.

## 2024-04-22 ENCOUNTER — DOCUMENTATION (OUTPATIENT)
Dept: OPERATING ROOM | Facility: CLINIC | Age: 59
End: 2024-04-22
Payer: COMMERCIAL

## 2024-04-22 NOTE — PROGRESS NOTES
[facility]  Claim [id number]   D.O.I 06.30.2005     · Herniated nucleus pulposus, L2-3 M51.26)   · Herniated nucleus pulposus, L3-4 (M51.26)   · Herniated nucleus pulposus, L4-5 (M51.26)   . Chondromalacia left knee M22.42    Patient department    Regarding: denied C9 for left knee intra-articular steroid injection    Dear Sir/    This is an reply to the denial C9 mentioned above.  This was reviewed by medical director who recommended denial of intra-articular steroid injection as well as epidural steroid injection.  In his review, the reviewer mentions that I am reporting the same exact thing across my medical notes and the patient is well-controlled on opioid regimen and she is still working since the injury.  Based on the above the reviewer recommended denial of any further intervention stating the side effects from injections.    Unfortunately, the reviewer is missing the point when I mention that the patient is controlled but we do the injections for episodes of aggravation.  The patient is controlled with opioid therapy at the baseline: However with episodes of aggravation I gave her an injection in order to bring the pain back to control without having to increase opioid pain medications.  This approach for the treatment has served Ms. Loomis very well and that she has been continuing to work, even though sometimes with difficulty, thanks to the excellent medical care and I am providing to her which included the treatment of the baseline pain as well as treatment for any aggravation episodes.     In addition, to the point of the reviewer the characteristics of the pain are similar at every visit.  And in person familiar with with the anatomy and clinical presentation of the injuries of Mrs. Loomis, with no that the characteristic of the pain do not change from visit to visit.  However the intensity does change.  This is why my notes are describing the same characteristics at every visit and and the main variable  "is the intensity which we are treating with opioid at baseline, and was injection for any episodes of aggravation.    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  I am not sure how the arthroscopy showed \"pristine\" knee.  Her clinical presentation is compatible with chondromalacia of the patella and this should be treated in order to control the pain and allow her to continue to function.     Please note, that Ms. Loomis is on opioid therapy only because her pain does not respond to other treatment modalities including: Physical therapy, nonsteroidal anti-inflammatory medication, TENS unit, other nonopioid adjuvant therapy, injections and surgeries.  After she failed to respond with adequate pain control to the above, we used opioid therapy as a fourth line of treatment.  This is concordant with the recommendations of multiple authorities in this field including the Federation of states medical boards (FSMB).  In addition Ms. Loomis is compliant with the treatment plan of using the medication exactly as prescribed.  This is clear from the review of the [place]RS report and her compliance with  every  Urine drug that she had.    Based on the above, I am requesting your reconsideration of the denied C9 for the left knee intra-articular steroid injection.     Ms. Loomis, I am sure, would appreciate your understanding and allowing her to see the pain to allow her to continue to work.     Respectfully submitted      Iván Johns MD            "

## 2024-05-02 ENCOUNTER — PHARMACY VISIT (OUTPATIENT)
Dept: PHARMACY | Facility: CLINIC | Age: 59
End: 2024-05-02
Payer: COMMERCIAL

## 2024-05-02 ENCOUNTER — OFFICE VISIT (OUTPATIENT)
Dept: PRIMARY CARE | Facility: CLINIC | Age: 59
End: 2024-05-02
Payer: COMMERCIAL

## 2024-05-02 VITALS — SYSTOLIC BLOOD PRESSURE: 130 MMHG | WEIGHT: 182 LBS | BODY MASS INDEX: 30.77 KG/M2 | DIASTOLIC BLOOD PRESSURE: 80 MMHG

## 2024-05-02 DIAGNOSIS — J30.2 SEASONAL ALLERGIES: ICD-10-CM

## 2024-05-02 DIAGNOSIS — Z00.00 ROUTINE GENERAL MEDICAL EXAMINATION AT A HEALTH CARE FACILITY: ICD-10-CM

## 2024-05-02 DIAGNOSIS — R05.8 PRODUCTIVE COUGH: ICD-10-CM

## 2024-05-02 DIAGNOSIS — I10 BENIGN ESSENTIAL HYPERTENSION: ICD-10-CM

## 2024-05-02 DIAGNOSIS — R13.19 ESOPHAGEAL DYSPHAGIA: Primary | ICD-10-CM

## 2024-05-02 PROCEDURE — 3079F DIAST BP 80-89 MM HG: CPT | Performed by: INTERNAL MEDICINE

## 2024-05-02 PROCEDURE — RXMED WILLOW AMBULATORY MEDICATION CHARGE

## 2024-05-02 PROCEDURE — 99214 OFFICE O/P EST MOD 30 MIN: CPT | Performed by: INTERNAL MEDICINE

## 2024-05-02 PROCEDURE — 3075F SYST BP GE 130 - 139MM HG: CPT | Performed by: INTERNAL MEDICINE

## 2024-05-02 RX ORDER — FLUTICASONE PROPIONATE 50 MCG
1 SPRAY, SUSPENSION (ML) NASAL DAILY
Qty: 16 G | Refills: 11 | Status: SHIPPED | OUTPATIENT
Start: 2024-05-02 | End: 2025-05-02

## 2024-05-02 RX ORDER — PANTOPRAZOLE SODIUM 40 MG/1
40 TABLET, DELAYED RELEASE ORAL DAILY
Qty: 30 TABLET | Refills: 0 | Status: SHIPPED | OUTPATIENT
Start: 2024-05-02

## 2024-05-02 RX ORDER — CETIRIZINE HYDROCHLORIDE 10 MG/1
10 TABLET ORAL DAILY
Qty: 30 TABLET | Refills: 2 | Status: SHIPPED | OUTPATIENT
Start: 2024-05-02 | End: 2024-07-31

## 2024-05-02 RX ORDER — AMLODIPINE BESYLATE 10 MG/1
10 TABLET ORAL DAILY
Qty: 90 TABLET | Refills: 1 | Status: SHIPPED | OUTPATIENT
Start: 2024-05-02

## 2024-05-02 ASSESSMENT — ENCOUNTER SYMPTOMS
FEVER: 0
HEARTBURN: 0
DYSPNEA ON EXERTION: 0
DIARRHEA: 0
VOMITING: 0
NEUROLOGICAL NEGATIVE: 1
BLOATING: 1
SNORING: 0
HEMOPTYSIS: 1
CHILLS: 0
ABDOMINAL PAIN: 0
SPUTUM PRODUCTION: 1
CONSTIPATION: 0
WHEEZING: 0
COUGH: 1
NAUSEA: 0

## 2024-05-02 NOTE — PROGRESS NOTES
Subjective   Roshni Loomis is a 58 y.o. female with a PMH of HTN, herniated nucleus pulposus, chondromalacia of left patella, lupus and MCTD, polyarthralgia who is presenting for an annual visit. Patient complaining of worsening productive cough and congestion in the center of her chest. Sputum pictures shown showed trace blood. Denies fevers or chills. Patient states that this has been going on for about a year. She was seen as a virtual visit in 12/2023 for this issue, was given a course of Augmentin which helped her symptoms temporarily, however her symptoms return. Denies any sick contacts, pets at home, or recent travel history. Patient reports solid food dysphagia also in the center of her chest, requiring that she drinks water to have the food go down. Patient also reports feeling bloated, it comes and goes. She reports regular bowel movements. States that she feels full much faster than usual. Has never smoked cigarettes.      Chief Complaint:  Chronic chest congestion, dysphagia    Review of Systems   Constitutional: Negative for chills and fever.   HENT:  Positive for congestion.    Cardiovascular:  Negative for chest pain and dyspnea on exertion.   Respiratory:  Positive for cough (Constantly has the urge to clear throat), hemoptysis and sputum production. Negative for snoring and wheezing.    Gastrointestinal:  Positive for bloating. Negative for abdominal pain, constipation, diarrhea, heartburn, nausea and vomiting.   Genitourinary: Negative.    Neurological: Negative.        Objective   Vitals reviewed.   Constitutional:       Appearance: Healthy appearance. Not in distress.   Pulmonary:      Effort: Pulmonary effort is normal.      Breath sounds: Normal breath sounds. No wheezing. No rhonchi.   Chest:      Chest wall: Not tender to palpatation.   Cardiovascular:      Normal rate. Regular rhythm.      Murmurs: There is no murmur.   Abdominal:      General: There is no distension.      Palpations:  Abdomen is soft.      Tenderness: There is no abdominal tenderness.   Musculoskeletal:         General: Tenderness (TTP in left knee) present. Neurological:      General: No focal deficit present.      Mental Status: Alert and oriented to person, place and time.         Lab Review:   Lab on 02/08/2024   Component Date Value    Creatinine, Urine Random 02/08/2024 140.5     Amphetamine Screen, Urine 02/08/2024 Presumptive Negative     Barbiturate Screen, Urine 02/08/2024 Presumptive Negative     Cannabinoid Screen, Urine 02/08/2024 Presumptive Negative     Cocaine Metabolite Scree* 02/08/2024 Presumptive Negative     PCP Screen, Urine 02/08/2024 Presumptive Negative     Clonazepam 02/08/2024 <25     7-Aminoclonazepam 02/08/2024 <25     Alprazolam 02/08/2024 <25     Alpha-Hydroxyalprazolam 02/08/2024 <25     Midazolam 02/08/2024 <25     Alpha-Hydroxymidazolam 02/08/2024 <25     Chlordiazepoxide 02/08/2024 <25     Diazepam 02/08/2024 <25     Nordiazepam 02/08/2024 <25     Temazepam 02/08/2024 <25     Oxazepam 02/08/2024 <25     Lorazepam 02/08/2024 <25     Methadone 02/08/2024 <25     EDDP 02/08/2024 <25     6-Acetylmorphine 02/08/2024 <25     Codeine 02/08/2024 <50     Hydrocodone 02/08/2024 115 (H)     Hydromorphone 02/08/2024 44 (H)     Morphine  02/08/2024 <50     Norhydrocodone 02/08/2024 143 (H)     Noroxycodone 02/08/2024 <25     Oxycodone 02/08/2024 <25     Oxymorphone 02/08/2024 <25     Fentanyl 02/08/2024 <2.5     Norfentanyl 02/08/2024 <2.5     Tramadol 02/08/2024 <50     O-Desmethyltramadol 02/08/2024 <50     Zolpidem 02/08/2024 <25     Zolpidem Metabolite (ZCA) 02/08/2024 <25        Assessment/Plan   There were no encounter diagnoses.  #Annual physical  #Chronic mucus production  #Dysphagia and bloating  - Ordered labs: CBC, CMP, mag, phos, lipid panel, A1c, TSH, Vitamin D  - Screening for HIV and Hep C  - Patient not due for repeat Pap smear until 2025. Patient follows up with OBGYN  - Mammogram completed  this year, unremarkable  - Ordered a CXR to assess for potential pneumonia vs other etiology of chronic cough/mucus production  - Will trial Protonix 40 mg daily for a month if this is a chronic GERD picture  - GI referral put in place for this issue, unclear if she will need EGD  - If GI workup is negative, will order pulmonology referral    #Seasonal allergies  - Starting cetirizine 10 mg daily  - Starting Flonase sprays daily as well    #HTN  - /80 and stable  - Continue amlodipine 10 mg daily    #SLE  #MCTD  - Concern that rheumatologic disease may be contributing to her current symptoms  - Continue methotrexate 12.5 mg once a week    #Herniated nucleus pulposus  #Chondromalacia of left patella  - Continue to follow up with pain management

## 2024-05-06 ENCOUNTER — OFFICE VISIT (OUTPATIENT)
Dept: PAIN MEDICINE | Facility: CLINIC | Age: 59
End: 2024-05-06
Payer: COMMERCIAL

## 2024-05-06 DIAGNOSIS — M51.26 HERNIATED NUCLEUS PULPOSUS, L2-3: Primary | ICD-10-CM

## 2024-05-06 DIAGNOSIS — M51.26 HERNIATED NUCLEUS PULPOSUS, L3-4: ICD-10-CM

## 2024-05-06 DIAGNOSIS — M51.26 HERNIATED NUCLEUS PULPOSUS, L4-5: ICD-10-CM

## 2024-05-06 DIAGNOSIS — M22.42 CHONDROMALACIA OF LEFT PATELLA: ICD-10-CM

## 2024-05-06 PROCEDURE — 99214 OFFICE O/P EST MOD 30 MIN: CPT | Performed by: PHYSICAL MEDICINE & REHABILITATION

## 2024-05-06 NOTE — PROGRESS NOTES
[facility]  Claim [id number]   D.O.I 06.30.2005     · Herniated nucleus pulposus, L2-3 M51.26)   · Herniated nucleus pulposus, L3-4 (M51.26)   · Herniated nucleus pulposus, L4-5 (M51.26)   . Chondromalacia left knee M22.42        Chief complaint  Left knee pain   Back pain    History  Roshni Loomis is back for pain management office visit  The left knee intraarticular steroid injection was denied. I appealed that. Waiting on decision by [facility]  Meanwhile continues with pain in the left knee The pain in the left  knee is deep achy stabbing.  It is both on the medial and lateral aspects and behind the kneecap.  The knee pain is associated with sensation of crunching upon range of motion and weightbearing.  The pain is continuous at rest associated with stiffness with prolonged sitting and get worse with standing walking or any weightbearing activity.  Occasionally there is knee swelling.  No change in color or texture of the skin.  No pain proximal to the thigh or distal to the leg associated with the knee pain.  With episodes of aggravation of the pain there are occasion of sensation of instability but no falls.     Also pain int eh back and left left and intermittent r leg. The pain in the back is deep achy worse in the mid back area.  This is associated with tight muscle bands.  This limits the range of motion of the lumbar spine mainly in forward flexion.  The pain in the back is radiating around the side on the lateral aspect of the   thighs going down toward the lateral aspect of the legs into the lateral malleosli toward the lateral aspect of the feet towards the big toes.    This pain down to the lower limbs is more of a burning tingling sensation.  The pain in the   lower limbs worsens with bending forward or with any lifting, it improves with laying on the side and resting.  This is similar to the associated pain in the middle to lower back.  Denied any bowel or bladder incontinence.  With the worst pain there is  tendency to catch the toe especially on carpeted area.  This occurs mostly when tired and toward the end of the day.     The pain generally controlled with the meds but with aggravation  she gets injections to keep pain in check to avoid increasing pain meds. The combination of hte two is keeping pain in check      Pain level without medication is 8/10 , with the medication pain level 5/10. Bc of hte aggravation    The pain meds are helping control the pain and improving Activities of Daily living and quality of life and quality of sleep.    opioids treatment agreement Jan 2024     Oarrs pulled and scanned in the chart  no concerns  last urine toxicology testing earlier this year and it was compliant we will repeat  Xray updated spine   ORT Score is  0  Pain pathology and pain generators spine and knee   Modalities tried injection, surgery, physical therapy, TENS unit, nonsteroidal anti-inflammatory medication       Denied any fever or chills. No weight loss and no night sweats. No cough or sputum production. No diarrhea   The constipation has been responding to fibers and over the counter medications.     No bladder and bowel incontinence and no other changes in bladder and bowel. No skin changes.  Reports tiredness and fatigability only if the pain is not controlled.   Denied opioids diversion and abuse and denies alcoholism. Denies overuse of the pain medications.    The control of the pain with the pain medications is helping the control of the symptoms and allowing the function and activities of daily living, enjoyment of life, improving the quality of life and sleep with less interruption by the pain. The goal is symptomatic control of the nonmalignant chronic pain and not to repair the permanent damage in the tissues inducing the chronic pain conditions. We are aiming to shift the focus from the nonmalignant chronic pain to other aspects of life by symptomatically treating this chronic pain. If this pain is  not treated it will lead to major morbidity and it is also associated with increased risks of mortality. The patient understands those very clearly and also understand high risks of morbidity and mortality if not strictly adherent to the treatment recommendations and reporting any associated side effects. Also patient understand the full responsibility associated with these medications to avoid abuse or overuse or any use of these medications for anything besides treating the patient's own chronic pain and nothing else under any circumstances.        Physical examination  Awake, alert and oriented for time place and persons   declined Chaperone for the visit and was adequately  draped for the exam.  As before positive loadiing of left knee    Diagnosis  Problem List Items Addressed This Visit       Chondromalacia of left patella    Herniated nucleus pulposus, L2-3 - Primary    Herniated nucleus pulposus, L4-5    Herniated nucleus pulposus, L3-4        Plan  Reviewed the pain generators.  Went over the types of pain with neuropathic and nociceptive and different pathologies and therapeutic modalities. Discussed the mechanism of action of interventions from acupuncture, physical therapy , regular exercises, injections, botox, spinal cord stimulation, and role of surgery     Went over pathology of the intervertebral disc displacement and the anatomical relation to the Nerve roots and relation to the radicular symptoms. Went over treatment modalities with conservative treatment including acupuncture   and epidural steroid injection with fluoroscopy guidance and last resort of surgery    Based on the above findings and the clinical response to the opioids medications and improvement of the activities of daily living, sleep, and work performance. We made this complex decision to continue the opioids therapy in light of the evidence of the patient's responsibility in using the pain medications as prescribed for the  nonmalignant chronic pain condition. We discussed about the use of the pain medications to treat the symptoms of chronic nonmalignant pain and we are not trying the repair the permanent damage in the tissues, rather we are trying to control the symptoms induced by the permanent damage to the tissues inducing the chronic pain condition and resulting disability. I explained the difference and discussed it with the patient and stressed the importance of knowing the difference especially because of the potential side effects and the potential addicting effect and habit forming nature of the dangerous drugs we are using to treat the symptoms of the chronic pain.      We discussed that we are prescribing the medications on good barber and legitimate medical reason.     We reviewed the side effects and precautions of opioids prescriptions as discussed in the opioids treatment agreement.    realizes the interaction between the therapeutic classes including the respiratory depression and potential death     Random drug testing twice in 6 months we will submit     Waitingon the approval for the injection to control pain to avoid increasing pain meds  Filled FMLA forms for her    Discussed about NSAIDS and I explained about the opioids sparing effect to allow keeping the opioids dose at minimal effective dose.   I went over the potential side effects of the NSAIDS on the gastrointestinal, renal and cardiovascular systems.      I detailed the side effects from the acetaminophen in the medication and made aware of those. I also explained about the cumulative effects on the organs and mainly the liver.     Given the opioids therapy , we discussed about the risk for accidental over dose on the pain medications, either for patient or other household. I went over the mechanism of action and mode of use of the Naloxone according to the  recommendations. I will provide a prescription for a kit.     Follow-up 4 weeks or  earlier if needed     The level of clinical decision making in this office visit,  is high, given the high risks of complications with the morbidity and mortality due to the fact that acute and chronic pain may pose a threat to life and bodily function, if under treated, poorly treated, or with failure to maintain adequate treatment and timely medical follow up. Additionally over treatment has its own set of complications including overdosing on the pain medications and also the habit forming potentials with the use of the medications used to treat chronic painful conditions including therapeutic classes classified as dangerous medications. Given the serious and fluctuating nature of pain level and instensity with extensive consideration for whenever pain changes, there is always the risk of prolonged functional impairment requiring close patient monitoring with regular assessments and reassessments and high level medical decision making at every office visit. The amount and complexity of data reviewed is high given the patient clinical presentation, labs,  data, radiology reports, and other tests as discussed during office visits. Pertinent data whether positive or negative were taken in consideration in the process of making this high level medical decision.

## 2024-05-07 ENCOUNTER — LAB (OUTPATIENT)
Dept: LAB | Facility: LAB | Age: 59
End: 2024-05-07
Payer: COMMERCIAL

## 2024-05-07 ENCOUNTER — APPOINTMENT (OUTPATIENT)
Dept: PAIN MEDICINE | Facility: CLINIC | Age: 59
End: 2024-05-07
Payer: COMMERCIAL

## 2024-05-07 DIAGNOSIS — Z00.00 ROUTINE GENERAL MEDICAL EXAMINATION AT A HEALTH CARE FACILITY: ICD-10-CM

## 2024-05-07 LAB
25(OH)D3 SERPL-MCNC: 29 NG/ML (ref 30–100)
ALBUMIN SERPL BCP-MCNC: 4.3 G/DL (ref 3.4–5)
ALP SERPL-CCNC: 47 U/L (ref 33–110)
ALT SERPL W P-5'-P-CCNC: 14 U/L (ref 7–45)
ANION GAP SERPL CALC-SCNC: 13 MMOL/L (ref 10–20)
AST SERPL W P-5'-P-CCNC: 17 U/L (ref 9–39)
BILIRUB SERPL-MCNC: 0.4 MG/DL (ref 0–1.2)
BUN SERPL-MCNC: 18 MG/DL (ref 6–23)
CALCIUM SERPL-MCNC: 9.5 MG/DL (ref 8.6–10.6)
CHLORIDE SERPL-SCNC: 103 MMOL/L (ref 98–107)
CHOLEST SERPL-MCNC: 139 MG/DL (ref 0–199)
CHOLESTEROL/HDL RATIO: 2.6
CO2 SERPL-SCNC: 30 MMOL/L (ref 21–32)
CREAT SERPL-MCNC: 0.75 MG/DL (ref 0.5–1.05)
EGFRCR SERPLBLD CKD-EPI 2021: >90 ML/MIN/1.73M*2
GLUCOSE SERPL-MCNC: 96 MG/DL (ref 74–99)
HDLC SERPL-MCNC: 53.6 MG/DL
MAGNESIUM SERPL-MCNC: 1.91 MG/DL (ref 1.6–2.4)
NON-HDL CHOLESTEROL: 85 MG/DL (ref 0–149)
PHOSPHATE SERPL-MCNC: 3.7 MG/DL (ref 2.5–4.9)
POTASSIUM SERPL-SCNC: 4.6 MMOL/L (ref 3.5–5.3)
PROT SERPL-MCNC: 7.8 G/DL (ref 6.4–8.2)
SODIUM SERPL-SCNC: 141 MMOL/L (ref 136–145)
TSH SERPL-ACNC: 1.03 MIU/L (ref 0.44–3.98)

## 2024-05-07 PROCEDURE — 82306 VITAMIN D 25 HYDROXY: CPT

## 2024-05-07 PROCEDURE — 83036 HEMOGLOBIN GLYCOSYLATED A1C: CPT

## 2024-05-07 PROCEDURE — 84100 ASSAY OF PHOSPHORUS: CPT

## 2024-05-07 PROCEDURE — 85025 COMPLETE CBC W/AUTO DIFF WBC: CPT

## 2024-05-07 PROCEDURE — 87389 HIV-1 AG W/HIV-1&-2 AB AG IA: CPT

## 2024-05-07 PROCEDURE — 86803 HEPATITIS C AB TEST: CPT

## 2024-05-07 PROCEDURE — 36415 COLL VENOUS BLD VENIPUNCTURE: CPT

## 2024-05-07 PROCEDURE — 82465 ASSAY BLD/SERUM CHOLESTEROL: CPT

## 2024-05-07 PROCEDURE — 83735 ASSAY OF MAGNESIUM: CPT

## 2024-05-07 PROCEDURE — 84443 ASSAY THYROID STIM HORMONE: CPT

## 2024-05-07 PROCEDURE — 80053 COMPREHEN METABOLIC PANEL: CPT

## 2024-05-07 PROCEDURE — 83718 ASSAY OF LIPOPROTEIN: CPT

## 2024-05-08 LAB
BASOPHILS # BLD AUTO: 0.02 X10*3/UL (ref 0–0.1)
BASOPHILS NFR BLD AUTO: 0.6 %
EOSINOPHIL # BLD AUTO: 0.05 X10*3/UL (ref 0–0.7)
EOSINOPHIL NFR BLD AUTO: 1.4 %
ERYTHROCYTE [DISTWIDTH] IN BLOOD BY AUTOMATED COUNT: 13 % (ref 11.5–14.5)
EST. AVERAGE GLUCOSE BLD GHB EST-MCNC: 114 MG/DL
HBA1C MFR BLD: 5.6 %
HCT VFR BLD AUTO: 38.9 % (ref 36–46)
HCV AB SER QL: NONREACTIVE
HGB BLD-MCNC: 12.4 G/DL (ref 12–16)
HIV 1+2 AB+HIV1 P24 AG SERPL QL IA: NONREACTIVE
IMM GRANULOCYTES # BLD AUTO: 0.01 X10*3/UL (ref 0–0.7)
IMM GRANULOCYTES NFR BLD AUTO: 0.3 % (ref 0–0.9)
LYMPHOCYTES # BLD AUTO: 1.38 X10*3/UL (ref 1.2–4.8)
LYMPHOCYTES NFR BLD AUTO: 39.2 %
MCH RBC QN AUTO: 28.1 PG (ref 26–34)
MCHC RBC AUTO-ENTMCNC: 31.9 G/DL (ref 32–36)
MCV RBC AUTO: 88 FL (ref 80–100)
MONOCYTES # BLD AUTO: 0.52 X10*3/UL (ref 0.1–1)
MONOCYTES NFR BLD AUTO: 14.8 %
NEUTROPHILS # BLD AUTO: 1.54 X10*3/UL (ref 1.2–7.7)
NEUTROPHILS NFR BLD AUTO: 43.7 %
NRBC BLD-RTO: 0 /100 WBCS (ref 0–0)
PLATELET # BLD AUTO: 316 X10*3/UL (ref 150–450)
RBC # BLD AUTO: 4.41 X10*6/UL (ref 4–5.2)
WBC # BLD AUTO: 3.5 X10*3/UL (ref 4.4–11.3)

## 2024-05-22 ENCOUNTER — HOSPITAL ENCOUNTER (OUTPATIENT)
Dept: RADIOLOGY | Facility: CLINIC | Age: 59
Discharge: HOME | End: 2024-05-22
Payer: COMMERCIAL

## 2024-05-22 DIAGNOSIS — R05.8 PRODUCTIVE COUGH: ICD-10-CM

## 2024-05-22 PROCEDURE — 71046 X-RAY EXAM CHEST 2 VIEWS: CPT | Performed by: RADIOLOGY

## 2024-05-22 PROCEDURE — 71046 X-RAY EXAM CHEST 2 VIEWS: CPT

## 2024-05-28 ENCOUNTER — OFFICE VISIT (OUTPATIENT)
Dept: PAIN MEDICINE | Facility: CLINIC | Age: 59
End: 2024-05-28
Payer: COMMERCIAL

## 2024-05-28 DIAGNOSIS — M51.26 HERNIATED NUCLEUS PULPOSUS, L2-3: Primary | ICD-10-CM

## 2024-05-28 DIAGNOSIS — M51.26 HERNIATED NUCLEUS PULPOSUS, L3-4: ICD-10-CM

## 2024-05-28 DIAGNOSIS — M22.42 CHONDROMALACIA OF LEFT PATELLA: ICD-10-CM

## 2024-05-28 DIAGNOSIS — M51.26 HERNIATED NUCLEUS PULPOSUS, L4-5: ICD-10-CM

## 2024-05-28 PROCEDURE — 99214 OFFICE O/P EST MOD 30 MIN: CPT | Performed by: PHYSICAL MEDICINE & REHABILITATION

## 2024-05-28 RX ORDER — HYDROCODONE BITARTRATE AND ACETAMINOPHEN 7.5; 325 MG/1; MG/1
1 TABLET ORAL EVERY 8 HOURS PRN
Qty: 84 TABLET | Refills: 0 | Status: SHIPPED | OUTPATIENT
Start: 2024-06-06 | End: 2024-07-04

## 2024-05-28 RX ORDER — HYDROCODONE BITARTRATE AND ACETAMINOPHEN 7.5; 325 MG/1; MG/1
1 TABLET ORAL EVERY 8 HOURS PRN
Qty: 84 TABLET | Refills: 0 | Status: SHIPPED | OUTPATIENT
Start: 2024-07-04 | End: 2024-08-01

## 2024-05-28 NOTE — PROGRESS NOTES
Health system  Claim [id number]   D.O.I 06.30.2005     · Herniated nucleus pulposus, L2-3 M51.26)   · Herniated nucleus pulposus, L3-4 (M51.26)   · Herniated nucleus pulposus, L4-5 (M51.26)   . Chondromalacia left knee M22.42      Chief complaint  Back and right leg pain     History  Roshni Loomis is back for pain management office visit  We requested Priyanka fr the pain aggravation  but that was denied. She is in the appeal process  Continues to have pain in the back and r leg. In past it was on the left. But the disc is round and can leak on either side  The pain in the back is deep achy worse in the mid back area.  This is associated with tight muscle bands.  This limits the range of motion of the lumbar spine mainly in forward flexion.  The pain in the back is radiating around the side on the lateral aspect of the right thigh going down toward the lateral aspect of the right leg into the lateral malleolus toward the lateral aspect of the foot towards the big toe.    This pain down to the right lower limb is more of a burning tingling sensation.  The pain in the right lower limb worsens with bending forward or with any lifting, it improves with laying on the side and resting.  This is similar to the associated pain in the middle to lower back.  Denied any bowel or bladder incontinence.  With the worst pain there is tendency to catch the toe especially on carpeted area.  This occurs mostly when tired and toward the end of the day.    The skin is intact with no breakdown.  No vesicles.       Pain level without medication is 8/10 , with the medication pain level 6/10. Bc of hte aggravation      The pain meds are helping control the pain and improving Activities of Daily living and quality of life and quality of sleep.    opioids treatment agreement Jan 2024  Pill count today, using count tray, and in front of patient :  26    pills , last fill was on 5/9  for 84 tabs,  the count is correct  Oarrs pulled and scanned in the chart   no concerns  last urine toxicology testing earlier this year and it was compliant we will repeat  Xray updated spine   ORT Score is  0  Pain pathology and pain generators spine   Modalities tried injection, surgery, physical therapy, TENS unit, nonsteroidal anti-inflammatory medication       Denied any fever or chills. No weight loss and no night sweats. No cough or sputum production. No diarrhea   The constipation has been responding to fibers and over the counter medications.     No bladder and bowel incontinence and no other changes in bladder and bowel. No skin changes.  Reports tiredness and fatigability only if the pain is not controlled.   Denied opioids diversion and abuse and denies alcoholism. Denies overuse of the pain medications.    The control of the pain with the pain medications is helping the control of the symptoms and allowing the function and activities of daily living, enjoyment of life, improving the quality of life and sleep with less interruption by the pain. The goal is symptomatic control of the nonmalignant chronic pain and not to repair the permanent damage in the tissues inducing the chronic pain conditions. We are aiming to shift the focus from the nonmalignant chronic pain to other aspects of life by symptomatically treating this chronic pain. If this pain is not treated it will lead to major morbidity and it is also associated with increased risks of mortality. The patient understands those very clearly and also understand high risks of morbidity and mortality if not strictly adherent to the treatment recommendations and reporting any associated side effects. Also patient understand the full responsibility associated with these medications to avoid abuse or overuse or any use of these medications for anything besides treating the patient's own chronic pain and nothing else under any circumstances.        Physical examination  Awake, alert and oriented for time place and persons    declined Chaperone for the visit and was adequately  draped for the exam.    Examination of the lumbar spine showed tight muscle bands in the mid and lower back area, this is more pronounced on the right compared to the left.  Additionally, this is inducing mild reversal of the lumbar lordosis with functional scoliosis with right-sided concavity.  This scoliosis corrected with  bending of the lumbar spine.     Straight leg raising increased the pain in the back and down the lateral aspect of the right knee onto the lateral leg to the lateral malleolus and foot.     There is decreased sensory to light touch on the lateral aspect of the thigh and around the right knee going down to the lateral aspect of the leg to the right lateral malleolus into the dorsum of the foot..    Deep tendon reflexes is present for the patellar tendons bilaterally.  Achilles reflexes are present bilaterally and symmetric.    Medial Hamstrings reflex is decreased on the right compared to the left side.  Plantar cutaneous are downgoing.  Ankle dorsiflexion is 5/5 bilaterally.  Plantar flexion of the ankles are 5/5 bilaterally.  Big toe extension is 4/5 on the right compared to 5/5 on the left side.    Negative Tinel's sign over the right peroneal nerve at the fibular neck.  Mary sign for axial loading and global rotation are negative.  No aberrant pain behavior.       Diagnosis  Problem List Items Addressed This Visit       Chondromalacia of left patella    Relevant Medications    HYDROcodone-acetaminophen (Norco) 7.5-325 mg tablet (Start on 6/6/2024)    HYDROcodone-acetaminophen (Norco) 7.5-325 mg tablet (Start on 7/4/2024)    Herniated nucleus pulposus, L2-3 - Primary    Relevant Medications    HYDROcodone-acetaminophen (Norco) 7.5-325 mg tablet (Start on 6/6/2024)    HYDROcodone-acetaminophen (Norco) 7.5-325 mg tablet (Start on 7/4/2024)    Herniated nucleus pulposus, L4-5    Relevant Medications    HYDROcodone-acetaminophen (Norco)  7.5-325 mg tablet (Start on 6/6/2024)    HYDROcodone-acetaminophen (Norco) 7.5-325 mg tablet (Start on 7/4/2024)    Herniated nucleus pulposus, L3-4    Relevant Medications    HYDROcodone-acetaminophen (Norco) 7.5-325 mg tablet (Start on 6/6/2024)    HYDROcodone-acetaminophen (Norco) 7.5-325 mg tablet (Start on 7/4/2024)        Plan  Reviewed the pain generators.  Went over the types of pain with neuropathic and nociceptive and different pathologies and therapeutic modalities. Discussed the mechanism of action of interventions from acupuncture, physical therapy , regular exercises, injections, botox, spinal cord stimulation, and role of surgery     Went over pathology of the intervertebral disc displacement and the anatomical relation to the Nerve roots and relation to the radicular symptoms. Went over treatment modalities with conservative treatment including acupuncture   and epidural steroid injection with fluoroscopy guidance and last resort of surgery    Based on the above findings and the clinical response to the opioids medications and improvement of the activities of daily living, sleep, and work performance. We made this complex decision to continue the opioids therapy in light of the evidence of the patient's responsibility in using the pain medications as prescribed for the nonmalignant chronic pain condition. We discussed about the use of the pain medications to treat the symptoms of chronic nonmalignant pain and we are not trying the repair the permanent damage in the tissues, rather we are trying to control the symptoms induced by the permanent damage to the tissues inducing the chronic pain condition and resulting disability. I explained the difference and discussed it with the patient and stressed the importance of knowing the difference especially because of the potential side effects and the potential addicting effect and habit forming nature of the dangerous drugs we are using to treat the  symptoms of the chronic pain.      We discussed that we are prescribing the medications on good barber and legitimate medical reason.     We reviewed the side effects and precautions of opioids prescriptions as discussed in the opioids treatment agreement.    realizes the interaction between the therapeutic classes including the respiratory depression and potential death     Random drug testing   we will submit     Check on the appeal for denied DEB  Continuw with pain meds in meanwhile    Discussed about NSAIDS and I explained about the opioids sparing effect to allow keeping the opioids dose at minimal effective dose.   I went over the potential side effects of the NSAIDS on the gastrointestinal, renal and cardiovascular systems.      I detailed the side effects from the acetaminophen in the medication and made aware of those. I also explained about the cumulative effects on the organs and mainly the liver.     Given the opioids therapy , we discussed about the risk for accidental over dose on the pain medications, either for patient or other household. I went over the mechanism of action and mode of use of the Naloxone according to the  recommendations. I will provide a prescription for a kit.     Follow-up 8 weeks or earlier if needed     The level of clinical decision making in this office visit,  is high, given the high risks of complications with the morbidity and mortality due to the fact that acute and chronic pain may pose a threat to life and bodily function, if under treated, poorly treated, or with failure to maintain adequate treatment and timely medical follow up. Additionally over treatment has its own set of complications including overdosing on the pain medications and also the habit forming potentials with the use of the medications used to treat chronic painful conditions including therapeutic classes classified as dangerous medications. Given the serious and fluctuating nature of pain  level and instensity with extensive consideration for whenever pain changes, there is always the risk of prolonged functional impairment requiring close patient monitoring with regular assessments and reassessments and high level medical decision making at every office visit. The amount and complexity of data reviewed is high given the patient clinical presentation, labs,  data, radiology reports, and other tests as discussed during office visits. Pertinent data whether positive or negative were taken in consideration in the process of making this high level medical decision.

## 2024-06-03 ENCOUNTER — TELEPHONE (OUTPATIENT)
Dept: PAIN MEDICINE | Facility: CLINIC | Age: 59
End: 2024-06-03

## 2024-06-28 ENCOUNTER — APPOINTMENT (OUTPATIENT)
Dept: GASTROENTEROLOGY | Facility: CLINIC | Age: 59
End: 2024-06-28
Payer: COMMERCIAL

## 2024-06-28 VITALS — BODY MASS INDEX: 31.51 KG/M2 | HEIGHT: 64 IN | HEART RATE: 68 BPM | WEIGHT: 184.6 LBS | OXYGEN SATURATION: 96 %

## 2024-06-28 DIAGNOSIS — R04.2 HEMOPTYSIS: Primary | ICD-10-CM

## 2024-06-28 DIAGNOSIS — R13.19 ESOPHAGEAL DYSPHAGIA: ICD-10-CM

## 2024-06-28 PROCEDURE — 99204 OFFICE O/P NEW MOD 45 MIN: CPT

## 2024-06-28 PROCEDURE — 1036F TOBACCO NON-USER: CPT

## 2024-06-28 RX ORDER — PANTOPRAZOLE SODIUM 40 MG/1
40 TABLET, DELAYED RELEASE ORAL DAILY
Qty: 30 TABLET | Refills: 3 | Status: SHIPPED | OUTPATIENT
Start: 2024-06-28 | End: 2024-07-28

## 2024-06-28 ASSESSMENT — ENCOUNTER SYMPTOMS
ABDOMINAL DISTENTION: 1
VOMITING: 0
NAUSEA: 0
APPETITE CHANGE: 1
FATIGUE: 0
BLOOD IN STOOL: 0
ANAL BLEEDING: 0
FEVER: 0
TROUBLE SWALLOWING: 1
CHILLS: 0
COUGH: 0
RECTAL PAIN: 0
DIARRHEA: 0
SHORTNESS OF BREATH: 0
CONSTIPATION: 0
ABDOMINAL PAIN: 0
SORE THROAT: 1

## 2024-06-28 NOTE — ASSESSMENT & PLAN NOTE
Consider esophagitis, esophageal stricture, duodenitis, PUD  -Schedule EGD  -Start 40 mg Protonix daily    Follow-up as needed

## 2024-06-28 NOTE — PROGRESS NOTES
Subjective     History of Present Illness:   Roshni Loomis is a 58 y.o. female with PMHx of KRYSTA, HTN, SLE who presents to GI clinic for further evaluation esophageal dysphagia    Today, patient for over a year has intermittent sore throat and throat clearing off and on.  Coughing up mucous and bright red blood periodically,bloating,  food sticks when swallowing in low throat, early satiety, right side of neck intermittently painful pressure waking her at night.  Takes Advil intermittently every few weeks to months.  Moves bowels daily with normal stools.  Denies constipation, diarrhea, dyspepsia, melena, hematochezia, unintentional weight loss    Denies ETOH, smoking, marijuana  Denies fxh GI cancer or IBD  Abdominal Surgeries: Uterine myomectomy    Last colonoscopy 12/2020 Dr. Olivas for screening: internal hemorrhoids.  Repeat 10 years  Last EGD - thinks she had a long time ago for anemia over 10 yrs ago      Past Medical History  As per HPI.     Social History  she  reports that she has never smoked. She has never used smokeless tobacco. She reports that she does not drink alcohol and does not use drugs.     Family History  her family history includes Breast cancer in her maternal grandmother; Cancer in her brother and another family member; Heart disease in her mother and another family member; Hypertension in her sister and another family member; Liver cancer in her father; Pancreatic cancer in her brother.     Review of Systems  Review of Systems   Constitutional:  Positive for appetite change. Negative for chills, fatigue and fever.   HENT:  Positive for sore throat and trouble swallowing.    Respiratory:  Negative for cough and shortness of breath.    Gastrointestinal:  Positive for abdominal distention. Negative for abdominal pain, anal bleeding, blood in stool, constipation, diarrhea, nausea, rectal pain and vomiting.       Allergies  Allergies   Allergen Reactions    Codeine Hives    Adhesive Rash      Leaves dark marks on skin.        Medications  Current Outpatient Medications   Medication Instructions    amLODIPine (Norvasc) 10 mg tablet TAKE 1 TABLET BY MOUTH ONCE DAILY    cetirizine (ZYRTEC) 10 mg, oral, Daily    fluticasone (Flonase) 50 mcg/actuation nasal spray 1 spray, Each Nostril, Daily, Shake gently. Before first use, prime pump. After use, clean tip and replace cap.    folic acid (Folvite) 1 mg tablet 1 tablet, oral, Daily    HYDROcodone-acetaminophen (Norco) 7.5-325 mg tablet 1 tablet, oral, Every 8 hours PRN    [START ON 7/4/2024] HYDROcodone-acetaminophen (Norco) 7.5-325 mg tablet 1 tablet, oral, Every 8 hours PRN    methotrexate (Trexall) 2.5 mg tablet 5 tablets, oral, Once Weekly    naloxone (Narcan) 4 mg/0.1 mL nasal spray nasal, spray one bottle into nostril while patient lay on there back , repeat if needed    pantoprazole (PROTONIX) 40 mg, oral, Daily, Do not crush, chew, or split. Take 30 minutes before a meal        Objective   Visit Vitals  Pulse 68      Physical Exam  Constitutional:       Appearance: Normal appearance. She is normal weight.   HENT:      Mouth/Throat:      Mouth: Mucous membranes are dry.      Pharynx: Oropharynx is clear.   Cardiovascular:      Rate and Rhythm: Normal rate and regular rhythm.      Heart sounds: Murmur heard.   Pulmonary:      Effort: Pulmonary effort is normal.      Breath sounds: Normal breath sounds. No wheezing or rhonchi.   Abdominal:      General: Abdomen is flat. Bowel sounds are normal. There is no distension.      Palpations: Abdomen is soft. There is no hepatomegaly.      Tenderness: There is no abdominal tenderness. There is no guarding or rebound. Negative signs include Ibarra's sign.      Hernia: No hernia is present.   Musculoskeletal:         General: Normal range of motion.   Skin:     General: Skin is warm and dry.   Neurological:      General: No focal deficit present.      Mental Status: She is alert and oriented to person, place, and  time.   Psychiatric:         Mood and Affect: Mood normal.         Behavior: Behavior normal.           Lab Results   Component Value Date    WBC 3.5 (L) 05/07/2024    WBC 3.6 (L) 03/30/2023    WBC 3.7 (L) 03/29/2022    HGB 12.4 05/07/2024    HGB 12.1 03/30/2023    HGB 11.5 (L) 03/29/2022    HCT 38.9 05/07/2024    HCT 38.9 03/30/2023    HCT 37.3 03/29/2022     05/07/2024     03/30/2023     03/29/2022     Lab Results   Component Value Date     05/07/2024     03/30/2023     03/29/2022    K 4.6 05/07/2024    K 3.7 03/30/2023    K 3.9 03/29/2022     05/07/2024     03/30/2023     03/29/2022    CO2 30 05/07/2024    CO2 30 03/30/2023    CO2 30 03/29/2022    BUN 18 05/07/2024    BUN 16 03/30/2023    BUN 14 03/29/2022    CREATININE 0.75 05/07/2024    CREATININE 0.80 03/30/2023    CREATININE 0.79 03/29/2022    CALCIUM 9.5 05/07/2024    CALCIUM 8.9 03/30/2023    CALCIUM 9.3 03/29/2022    PROT 7.8 05/07/2024    PROT 7.9 03/30/2023    PROT 7.7 03/29/2022    BILITOT 0.4 05/07/2024    BILITOT 0.5 03/30/2023    BILITOT 0.6 03/29/2022    ALKPHOS 47 05/07/2024    ALKPHOS 42 03/30/2023    ALKPHOS 54 03/29/2022    ALT 14 05/07/2024    ALT 13 03/30/2023    ALT 16 03/29/2022    AST 17 05/07/2024    AST 17 03/30/2023    AST 16 03/29/2022    GLUCOSE 96 05/07/2024    GLUCOSE 80 03/30/2023    GLUCOSE 84 03/29/2022           Roshni Loomis is a 58 y.o. female who presents to GI clinic for esophageal dysphagia and hemoptysis.    Esophageal dysphagia  Consider esophagitis, esophageal stricture, duodenitis, PUD  -Schedule EGD  -Start 40 mg Protonix daily    Follow-up as needed         Ellen Schaeffer, APRN-CNP

## 2024-06-28 NOTE — PATIENT INSTRUCTIONS
Please take Protonix 30-60 minutes before a meal daily.  This is to help calm stomach acid.  Please schedule your upper endoscopy.  Plan to have a ride for this procedure since it involves sedation.  Please do not eat or drink anything after midnight the night prior to this procedure.  Follow up as needed

## 2024-07-09 ENCOUNTER — TELEPHONE (OUTPATIENT)
Dept: PRIMARY CARE | Facility: CLINIC | Age: 59
End: 2024-07-09
Payer: COMMERCIAL

## 2024-07-11 ENCOUNTER — TELEMEDICINE (OUTPATIENT)
Dept: PRIMARY CARE | Facility: CLINIC | Age: 59
End: 2024-07-11
Payer: COMMERCIAL

## 2024-07-11 DIAGNOSIS — J30.2 SEASONAL ALLERGIES: ICD-10-CM

## 2024-07-11 DIAGNOSIS — J01.90 ACUTE SINUSITIS, RECURRENCE NOT SPECIFIED, UNSPECIFIED LOCATION: Primary | ICD-10-CM

## 2024-07-11 PROCEDURE — 99213 OFFICE O/P EST LOW 20 MIN: CPT | Performed by: INTERNAL MEDICINE

## 2024-07-11 RX ORDER — CETIRIZINE HYDROCHLORIDE 10 MG/1
10 TABLET ORAL DAILY
Qty: 30 TABLET | Refills: 0 | Status: SHIPPED | OUTPATIENT
Start: 2024-07-11 | End: 2024-10-09

## 2024-07-11 RX ORDER — AMOXICILLIN 875 MG/1
875 TABLET, FILM COATED ORAL 2 TIMES DAILY
Qty: 20 TABLET | Refills: 0 | Status: SHIPPED | OUTPATIENT
Start: 2024-07-11 | End: 2024-07-21

## 2024-07-11 NOTE — PROGRESS NOTES
Subjective   Patient ID: Roshni Loomis is a 58 y.o. female who presents via virtual visit for Sick Visit.  An interactive audio and video telecommunication system which permits real time communications between the patient (at the originating site) and provider (at the distant site) was utilized to provide this telehealth service.    Verbal consent was requested and obtained from the patient on the day of encounter.  HPI  Patient feels that she has a sinus infection. Pain around her eyes and  drainage. Pt has nasal congestion and headache and pressure. She states that symptoms has been on and off for 3 weeks.   Patient states that she has had this before.  Patient states that amoxicillin has worked for her in the past.  Patient has tried over-the-counter regimens and has not been able to clear this.      Review of Systems   All other systems reviewed and are negative.      Objective   Physical Exam  Constitutional:       General: She is awake.      Appearance: Normal appearance. She is well-developed.   Neurological:      Mental Status: She is alert.   Psychiatric:         Mood and Affect: Mood normal.         Behavior: Behavior normal.         Assessment/Plan     Problem List Items Addressed This Visit       Acute sinusitis - Primary    Relevant Medications    amoxicillin (Amoxil) 875 mg tablet     Other Visit Diagnoses       Seasonal allergies        Relevant Medications    cetirizine (ZyrTEC) 10 mg tablet

## 2024-07-22 ENCOUNTER — APPOINTMENT (OUTPATIENT)
Dept: PAIN MEDICINE | Facility: CLINIC | Age: 59
End: 2024-07-22
Payer: COMMERCIAL

## 2024-07-22 DIAGNOSIS — M22.42 CHONDROMALACIA OF LEFT PATELLA: ICD-10-CM

## 2024-07-22 DIAGNOSIS — M51.26 HERNIATED NUCLEUS PULPOSUS, L4-5: Primary | ICD-10-CM

## 2024-07-22 DIAGNOSIS — M51.26 HERNIATED NUCLEUS PULPOSUS, L2-3: ICD-10-CM

## 2024-07-22 DIAGNOSIS — M51.26 HERNIATED NUCLEUS PULPOSUS, L3-4: ICD-10-CM

## 2024-07-22 PROCEDURE — 99214 OFFICE O/P EST MOD 30 MIN: CPT | Performed by: PHYSICAL MEDICINE & REHABILITATION

## 2024-07-22 RX ORDER — SODIUM CHLORIDE, SODIUM LACTATE, POTASSIUM CHLORIDE, CALCIUM CHLORIDE 600; 310; 30; 20 MG/100ML; MG/100ML; MG/100ML; MG/100ML
20 INJECTION, SOLUTION INTRAVENOUS CONTINUOUS
OUTPATIENT
Start: 2024-07-22

## 2024-07-22 RX ORDER — HYDROCODONE BITARTRATE AND ACETAMINOPHEN 7.5; 325 MG/1; MG/1
1 TABLET ORAL EVERY 8 HOURS PRN
Qty: 84 TABLET | Refills: 0 | Status: SHIPPED | OUTPATIENT
Start: 2024-08-30 | End: 2024-09-27

## 2024-07-22 RX ORDER — HYDROCODONE BITARTRATE AND ACETAMINOPHEN 7.5; 325 MG/1; MG/1
1 TABLET ORAL EVERY 8 HOURS PRN
Qty: 84 TABLET | Refills: 0 | Status: SHIPPED | OUTPATIENT
Start: 2024-08-02 | End: 2024-08-30

## 2024-07-22 NOTE — PROGRESS NOTES
[facility]  Claim [id number]   D.O.I 06.30.2005     · Herniated nucleus pulposus, L2-3 M51.26)   · Herniated nucleus pulposus, L3-4 (M51.26)   · Herniated nucleus pulposus, L4-5 (M51.26)   . Chondromalacia left knee M22.42      Chief complaint  Back and legs pain     History  Roshni Loomis is back for pain management office visit  Having aggravation  of the back pain and lower limbs relative worse on the R side  The pain in the back is deep achy worse in the mid back area.  This is associated with tight muscle bands.  This limits the range of motion of the lumbar spine mainly in forward flexion.  The pain in the back is radiating around the side on the lateral aspect of the   thighs going down toward the lateral aspect of the legs into the lateral malleosli toward the lateral aspect of the feet towards the big toes.    This pain down to the lower limbs is more of a burning tingling sensation.  The pain in the   lower limbs worsens with bending forward or with any lifting, it improves with laying on the side and resting.  This is similar to the associated pain in the middle to lower back.  Denied any bowel or bladder incontinence.  With the worst pain there is tendency to catch the toe especially on carpeted area.  This occurs mostly when tired and toward the end of the day.       Pain level without medication is 8/10 , with the medication pain level 4/10 dur to the aggravation  .     The pain meds are helping control the pain and improving Activities of Daily living and quality of life and quality of sleep.    opioids treatment agreement Jan 2024  Pill count today, using count tray, and in front of patient :  33    pills , last fill was on 7/5  for 84 tabs,  the count is correct  Oarrs pulled and reviewed, no concerns  last urine toxicology testing earlier this year and it was compliant we will repeat  Xray updated spine and knee   ORT Score is  0  Pain pathology and pain generators spine and knee   Modalities tried  "injection, surgery, physical therapy, TENS unit, nonsteroidal anti-inflammatory medication       Review of Systems :  Denied any fever or chills. No weight loss and no night sweats. No cough or sputum production. No diarrhea   The constipation has been responding to fibers and over the counter medications.     No bladder and bowel incontinence and no other changes in bladder and bowel. No skin changes.  Reports tiredness and fatigability only if the pain is not controlled.     Denied opioids diversion and abuse and denies alcoholism. Denies overuse of the pain medications.  No reported euphoria sensation or getting a \"high\" on the pain medications.    The control of the pain with the pain medications is helping the control of the symptoms and allowing the function and activities of daily living, enjoyment of life, improving the quality of life and sleep with less interruption by the pain. The goal is symptomatic control of the nonmalignant chronic pain and not to repair the permanent damage in the tissues inducing the chronic pain conditions. We are aiming to shift the focus from the nonmalignant chronic pain to other aspects of life by symptomatically treating this chronic pain. If this pain is not treated it will lead to major morbidity and it is also associated with increased risks of mortality. The patient understands those very clearly and also understand high risks of morbidity and mortality if not strictly adherent to the treatment recommendations and reporting any associated side effects. Also patient understand the full responsibility associated with these medications to avoid abuse or overuse or any use of these medications for anything besides treating the patient's own chronic pain and nothing else under any circumstances.        Physical examination  Awake, alert and oriented for time place and persons   declined Chaperone for the visit and was adequately  draped for the exam.      There is decreased " sensory to light touch on the lateral aspects of the thighs and around the   knee going down to the lateral aspect of the legs to the   lateral malleoli into the dorsum of the feet..    Deep tendon reflexes is present for the patellar tendons bilaterally.  Achilles reflexes are present bilaterally and symmetric.    Medial Hamstrings reflex is decreased bilaterally  Plantar cutaneous are downgoing.  Ankle dorsiflexion is 5/5 bilaterally.  Plantar flexion of the ankles are 5/5 bilaterally.  Big toe extension is 4/5 bilaterally  Negative Tinel's sign over the right peroneal nerve at the fibular neck.  Mary sign for axial loading and global rotation are negative.  No aberrant pain behavior.     Diagnosis  Problem List Items Addressed This Visit       Chondromalacia of left patella    Relevant Medications    HYDROcodone-acetaminophen (Norco) 7.5-325 mg tablet (Start on 8/2/2024)    HYDROcodone-acetaminophen (Norco) 7.5-325 mg tablet (Start on 8/30/2024)    Herniated nucleus pulposus, L2-3    Relevant Medications    HYDROcodone-acetaminophen (Norco) 7.5-325 mg tablet (Start on 8/2/2024)    HYDROcodone-acetaminophen (Norco) 7.5-325 mg tablet (Start on 8/30/2024)    Herniated nucleus pulposus, L4-5 - Primary    Relevant Medications    HYDROcodone-acetaminophen (Norco) 7.5-325 mg tablet (Start on 8/2/2024)    HYDROcodone-acetaminophen (Norco) 7.5-325 mg tablet (Start on 8/30/2024)    Other Relevant Orders    FL pain management    Epidural Steroid Injection    Herniated nucleus pulposus, L3-4    Relevant Medications    HYDROcodone-acetaminophen (Norco) 7.5-325 mg tablet (Start on 8/2/2024)    HYDROcodone-acetaminophen (Norco) 7.5-325 mg tablet (Start on 8/30/2024)    Other Relevant Orders    FL pain management    Epidural Steroid Injection        Plan  Reviewed the pain generators.  Went over the types of pain with neuropathic and nociceptive and different pathologies and therapeutic modalities. Discussed the mechanism of  action of interventions from acupuncture, physical therapy , regular exercises, injections, botox, spinal cord stimulation, and role of surgery     Went over pathology of the intervertebral disc displacement and the anatomical relation to the Nerve roots and relation to the radicular symptoms. Went over treatment modalities with conservative treatment including acupuncture   and epidural steroid injection with fluoroscopy guidance and last resort of surgery    Based on the above findings and the clinical response to the opioids medications and improvement of the activities of daily living, sleep, and work performance. We made this complex decision to continue the opioids therapy in light of the evidence of the patient's responsibility in using the pain medications as prescribed for the nonmalignant chronic pain condition. We discussed about the use of the pain medications to treat the symptoms of chronic nonmalignant pain and we are not trying the repair the permanent damage in the tissues, rather we are trying to control the symptoms induced by the permanent damage to the tissues inducing the chronic pain condition and resulting disability. I explained the difference and discussed it with the patient and stressed the importance of knowing the difference especially because of the potential side effects and the potential addicting effect and habit forming nature of the dangerous drugs we are using to treat the symptoms of the chronic pain.      We discussed that we are prescribing the medications on good barber and legitimate medical reason.     We reviewed the side effects and precautions of opioids prescriptions as discussed in the opioids treatment agreement.    realizes the interaction between the therapeutic classes including the respiratory depression and potential death     Random drug testing   we will submit     Consider Priyanka for hte aggravation  of the back  Risks not limited to infection, sepsis, abscess,  bleeding , nerve injury, paralysis, and death...     Discussed about NSAIDS and I explained about the opioids sparing effect to allow keeping the opioids dose at minimal effective dose.   I went over the potential side effects of the NSAIDS on the gastrointestinal, renal and cardiovascular systems.      I detailed the side effects from the acetaminophen in the medication and made aware of those. I also explained about the cumulative effects on the organs and mainly the liver.     Given the opioids therapy , we discussed about the risk for accidental over dose on the pain medications, either for patient or other household. I went over the mechanism of action and mode of use of the Naloxone according to the  recommendations. I will provide a prescription for a kit.     Follow-up 8 weeks or earlier if needed     The level of clinical decision making in this office visit,  is high, given the high risks of complications with the morbidity and mortality due to the fact that acute and chronic pain may pose a threat to life and bodily function, if under treated, poorly treated, or with failure to maintain adequate treatment and timely medical follow up. Additionally over treatment has its own set of complications including overdosing on the pain medications and also the habit forming potentials with the use of the medications used to treat chronic painful conditions including therapeutic classes classified as dangerous medications. Given the serious and fluctuating nature of pain level and instensity with extensive consideration for whenever pain changes, there is always the risk of prolonged functional impairment requiring close patient monitoring with regular assessments and reassessments and high level medical decision making at every office visit. The amount and complexity of data reviewed is high given the patient clinical presentation, labs,  data, radiology reports, and other tests as discussed during  office visits. Pertinent data whether positive or negative were taken in consideration in the process of making this high level medical decision.    Wanted left knee intraarticular steroid injection   We discussed the risks and benefits of the injections including but not limited to nerve injury, infection, bleeding, headaches and paralysis and remotely death. The patient agreed to proceed, will perform the procedure with sterile techniques. Declined Chaperone for the procedure.  After appropriate draping, I used 40 mg of kenalog mixed with 5 cc of lidocaine and injected the left knee    post procedure care was discussed with the patient who agreed to proceed.  procedure tolerated very well. No complications encountered.

## 2024-07-30 ENCOUNTER — DOCUMENTATION (OUTPATIENT)
Dept: PAIN MEDICINE | Facility: CLINIC | Age: 59
End: 2024-07-30
Payer: COMMERCIAL
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
## 2024-08-01 ENCOUNTER — APPOINTMENT (OUTPATIENT)
Dept: PRIMARY CARE | Facility: CLINIC | Age: 59
End: 2024-08-01
Payer: COMMERCIAL

## 2024-08-05 ENCOUNTER — PHARMACY VISIT (OUTPATIENT)
Dept: PHARMACY | Facility: CLINIC | Age: 59
End: 2024-08-05
Payer: COMMERCIAL

## 2024-08-05 PROCEDURE — RXMED WILLOW AMBULATORY MEDICATION CHARGE

## 2024-08-06 ENCOUNTER — APPOINTMENT (OUTPATIENT)
Dept: GASTROENTEROLOGY | Facility: EXTERNAL LOCATION | Age: 59
End: 2024-08-06
Payer: COMMERCIAL

## 2024-08-06 ENCOUNTER — LAB REQUISITION (OUTPATIENT)
Dept: LAB | Facility: HOSPITAL | Age: 59
End: 2024-08-06
Payer: COMMERCIAL

## 2024-08-06 DIAGNOSIS — K44.9 HIATAL HERNIA: ICD-10-CM

## 2024-08-06 DIAGNOSIS — R04.2 HEMOPTYSIS: ICD-10-CM

## 2024-08-06 DIAGNOSIS — R68.81 EARLY SATIETY: ICD-10-CM

## 2024-08-06 DIAGNOSIS — R11.0 NAUSEA: Primary | ICD-10-CM

## 2024-08-06 DIAGNOSIS — R13.19 ESOPHAGEAL DYSPHAGIA: ICD-10-CM

## 2024-08-06 DIAGNOSIS — K21.00 GASTROESOPHAGEAL REFLUX DISEASE WITH ESOPHAGITIS WITHOUT HEMORRHAGE: ICD-10-CM

## 2024-08-06 PROCEDURE — 43239 EGD BIOPSY SINGLE/MULTIPLE: CPT | Performed by: INTERNAL MEDICINE

## 2024-08-06 PROCEDURE — 88305 TISSUE EXAM BY PATHOLOGIST: CPT

## 2024-08-09 LAB
LABORATORY COMMENT REPORT: NORMAL
PATH REPORT.FINAL DX SPEC: NORMAL
PATH REPORT.GROSS SPEC: NORMAL
PATH REPORT.RELEVANT HX SPEC: NORMAL
PATH REPORT.TOTAL CANCER: NORMAL

## 2024-08-21 ENCOUNTER — APPOINTMENT (OUTPATIENT)
Dept: RHEUMATOLOGY | Facility: CLINIC | Age: 59
End: 2024-08-21
Payer: COMMERCIAL

## 2024-09-23 ENCOUNTER — APPOINTMENT (OUTPATIENT)
Dept: PAIN MEDICINE | Facility: CLINIC | Age: 59
End: 2024-09-23
Payer: COMMERCIAL

## 2024-09-23 DIAGNOSIS — M51.26 HERNIATED NUCLEUS PULPOSUS, L3-4: ICD-10-CM

## 2024-09-23 DIAGNOSIS — M51.26 HERNIATED NUCLEUS PULPOSUS, L2-3: ICD-10-CM

## 2024-09-23 DIAGNOSIS — M22.42 CHONDROMALACIA OF LEFT PATELLA: ICD-10-CM

## 2024-09-23 DIAGNOSIS — M51.26 HERNIATED NUCLEUS PULPOSUS, L4-5: ICD-10-CM

## 2024-09-23 DIAGNOSIS — Z79.891 LONG TERM CURRENT USE OF OPIATE ANALGESIC: Primary | ICD-10-CM

## 2024-09-23 PROCEDURE — 99214 OFFICE O/P EST MOD 30 MIN: CPT | Performed by: PHYSICAL MEDICINE & REHABILITATION

## 2024-09-23 RX ORDER — HYDROCODONE BITARTRATE AND ACETAMINOPHEN 7.5; 325 MG/1; MG/1
1 TABLET ORAL EVERY 8 HOURS PRN
Qty: 84 TABLET | Refills: 0 | Status: SHIPPED | OUTPATIENT
Start: 2024-10-03 | End: 2024-10-31

## 2024-09-23 RX ORDER — HYDROCODONE BITARTRATE AND ACETAMINOPHEN 7.5; 325 MG/1; MG/1
1 TABLET ORAL EVERY 8 HOURS PRN
Qty: 84 TABLET | Refills: 0 | Status: SHIPPED | OUTPATIENT
Start: 2024-10-31 | End: 2024-11-28

## 2024-09-23 NOTE — PROGRESS NOTES
[facility]  Claim [id number]   D.O.I 06.30.2005     · Herniated nucleus pulposus, L2-3 M51.26)   · Herniated nucleus pulposus, L3-4 (M51.26)   · Herniated nucleus pulposus, L4-5 (M51.26)   . Chondromalacia left knee M22.42    Chief complaint  Back and lower limbs pain   Left knee pain     History  Roshni Loomis is back for pain management office visit  Continues with pain and lower limbs  The DEB was denied and we appealed  Continues to have pain in the left knee as before  Of note that generally the pain meds help her control symptoms until she has aggravation  . With aggravation  pain level goes up and we give her epidural steroid injection with fluoroscopy guidance for back and legs pain and knees intraarticular steroid injection for knee aggravation    These injection bring the pain level down to control with the meds    We are still trying to cut back on the pain meds   Long discussion about putting effort in cutting back on pain medications. Discussed about pain level changing and we do not know if the medications at this amount are still needed until we try and cut back slowly on pain medications. If the cut is tolerated then we continue. If cut not tolerated then, will go back on the pain medications level.  The goal from this is to keep the pain medications at the lowest effective dose.   She is not able to cut back at this time bc of the pain level      Pain level without medication is 8/10 , with the medication pain level 4 to 5 /10. Bc of hte aggravation  as above     The pain meds are helping control the pain and improving Activities of Daily living and quality of life and quality of sleep.    opioids treatment agreement Jan 2024  Pill count today, using count tray, and in front of patient :  33    pills , last fill was on 9/5  for 84 tabs,  the count is correct  Oarrs pulled and reviewed, no concerns  last urine toxicology testing earlier this year and it was compliant we will repeat  Xray updated spine  "  ORT Score is   0  Pain pathology and pain generators spine   Modalities tried injection, surgery, physical therapy, TENS unit, nonsteroidal anti-inflammatory medication       Review of Systems :  Denied any fever or chills. No weight loss and no night sweats. No cough or sputum production. No diarrhea   The constipation has been responding to fibers and over the counter medications.     No bladder and bowel incontinence and no other changes in bladder and bowel. No skin changes.  Reports tiredness and fatigability only if the pain is not controlled.     Denied opioids diversion and abuse and denies alcoholism. Denies overuse of the pain medications.  No reported euphoria sensation or getting a \"high\" on the pain medications.    The control of the pain with the pain medications is helping the control of the symptoms and allowing the function and activities of daily living, enjoyment of life, improving the quality of life and sleep with less interruption by the pain. The goal is symptomatic control of the nonmalignant chronic pain and not to repair the permanent damage in the tissues inducing the chronic pain conditions. We are aiming to shift the focus from the nonmalignant chronic pain to other aspects of life by symptomatically treating this chronic pain. If this pain is not treated it will lead to major morbidity and it is also associated with increased risks of mortality. The patient understands those very clearly and also understand high risks of morbidity and mortality if not strictly adherent to the treatment recommendations and reporting any associated side effects. Also patient understand the full responsibility associated with these medications to avoid abuse or overuse or any use of these medications for anything besides treating the patient's own chronic pain and nothing else under any circumstances.        Physical examination  Awake, alert and oriented for time place and persons   declined Chaperone " for the visit and was adequately  draped for the exam.  Examination of the left knee showed mild clinical effusion. Normal skin color and texture palpation of the knee showed tenderness over the medial and lateral joint line and the sensation of crepitation upon range of motion.  Range of motion from -3 degrees to 100 degrees. No medial lateral instability. No drawer sign.  Lachman is negative.  Positive apprehension sign left knee cap  Negative pivot shift.  Homans' sign is negative.  Calves are soft.  Knee flexion and extension is 4/5 and limited by the pain.     Also havingSLR increasing the pain in the back and legs.    Diagnosis  Problem List Items Addressed This Visit       Chondromalacia of left patella    Relevant Medications    HYDROcodone-acetaminophen (Norco) 7.5-325 mg tablet (Start on 10/3/2024)    HYDROcodone-acetaminophen (Norco) 7.5-325 mg tablet (Start on 10/31/2024)    Herniated nucleus pulposus, L2-3    Relevant Medications    HYDROcodone-acetaminophen (Norco) 7.5-325 mg tablet (Start on 10/3/2024)    HYDROcodone-acetaminophen (Norco) 7.5-325 mg tablet (Start on 10/31/2024)    Herniated nucleus pulposus, L4-5    Relevant Medications    HYDROcodone-acetaminophen (Norco) 7.5-325 mg tablet (Start on 10/3/2024)    HYDROcodone-acetaminophen (Norco) 7.5-325 mg tablet (Start on 10/31/2024)    Herniated nucleus pulposus, L3-4    Relevant Medications    HYDROcodone-acetaminophen (Norco) 7.5-325 mg tablet (Start on 10/3/2024)    HYDROcodone-acetaminophen (Norco) 7.5-325 mg tablet (Start on 10/31/2024)    Long term current use of opiate analgesic - Primary    Relevant Orders    Opiate/Opioid/Benzo Prescription Compliance        Plan  Reviewed the pain generators.  Went over the types of pain with neuropathic and nociceptive and different pathologies and therapeutic modalities. Discussed the mechanism of action of interventions from acupuncture, physical therapy , regular exercises, injections, botox, spinal  cord stimulation, and role of surgery     Went over pathology of the intervertebral disc displacement and the anatomical relation to the Nerve roots and relation to the radicular symptoms. Went over treatment modalities with conservative treatment including acupuncture   and epidural steroid injection with fluoroscopy guidance and last resort of surgery    Based on the above findings and the clinical response to the opioids medications and improvement of the activities of daily living, sleep, and work performance. We made this complex decision to continue the opioids therapy in light of the evidence of the patient's responsibility in using the pain medications as prescribed for the nonmalignant chronic pain condition. We discussed about the use of the pain medications to treat the symptoms of chronic nonmalignant pain and we are not trying the repair the permanent damage in the tissues, rather we are trying to control the symptoms induced by the permanent damage to the tissues inducing the chronic pain condition and resulting disability. I explained the difference and discussed it with the patient and stressed the importance of knowing the difference especially because of the potential side effects and the potential addicting effect and habit forming nature of the dangerous drugs we are using to treat the symptoms of the chronic pain.      We discussed that we are prescribing the medications on good barber and legitimate medical reason.     We reviewed the side effects and precautions of opioids prescriptions as discussed in the opioids treatment agreement.    realizes the interaction between the therapeutic classes including the respiratory depression and potential death     Random drug testing   we will submit     Check on status of the  appeal    Discussed about NSAIDS and I explained about the opioids sparing effect to allow keeping the opioids dose at minimal effective dose.   I went over the potential side  effects of the NSAIDS on the gastrointestinal, renal and cardiovascular systems.      I detailed the side effects from the acetaminophen in the medication and made aware of those. I also explained about the cumulative effects on the organs and mainly the liver.     Given the opioids therapy , we discussed about the risk for accidental over dose on the pain medications, either for patient or other household. I went over the mechanism of action and mode of use of the Naloxone according to the  recommendations. I will provide a prescription for a kit.     Follow-up 8 weeks or earlier if needed     The level of clinical decision making in this office visit,  is high, given the high risks of complications with the morbidity and mortality due to the fact that acute and chronic pain may pose a threat to life and bodily function, if under treated, poorly treated, or with failure to maintain adequate treatment and timely medical follow up. Additionally over treatment has its own set of complications including overdosing on the pain medications and also the habit forming potentials with the use of the medications used to treat chronic painful conditions including therapeutic classes classified as dangerous medications. Given the serious and fluctuating nature of pain level and instensity with extensive consideration for whenever pain changes, there is always the risk of prolonged functional impairment requiring close patient monitoring with regular assessments and reassessments and high level medical decision making at every office visit. The amount and complexity of data reviewed is high given the patient clinical presentation, labs,  data, radiology reports, and other tests as discussed during office visits. Pertinent data whether positive or negative were taken in consideration in the process of making this high level medical decision.

## 2024-10-25 ENCOUNTER — LAB (OUTPATIENT)
Dept: LAB | Facility: LAB | Age: 59
End: 2024-10-25
Payer: COMMERCIAL

## 2024-10-25 DIAGNOSIS — Z79.891 LONG TERM CURRENT USE OF OPIATE ANALGESIC: ICD-10-CM

## 2024-11-25 ENCOUNTER — APPOINTMENT (OUTPATIENT)
Dept: PAIN MEDICINE | Facility: CLINIC | Age: 59
End: 2024-11-25
Payer: COMMERCIAL

## 2024-11-25 DIAGNOSIS — G89.29 CHRONIC BILATERAL LOW BACK PAIN WITH BILATERAL SCIATICA: Primary | ICD-10-CM

## 2024-11-25 DIAGNOSIS — M51.26 HERNIATED NUCLEUS PULPOSUS, L3-4: ICD-10-CM

## 2024-11-25 DIAGNOSIS — M54.42 CHRONIC BILATERAL LOW BACK PAIN WITH BILATERAL SCIATICA: Primary | ICD-10-CM

## 2024-11-25 DIAGNOSIS — M54.41 CHRONIC BILATERAL LOW BACK PAIN WITH BILATERAL SCIATICA: Primary | ICD-10-CM

## 2024-11-25 DIAGNOSIS — M51.26 HERNIATED NUCLEUS PULPOSUS, L4-5: ICD-10-CM

## 2024-11-25 DIAGNOSIS — M51.26 HERNIATED NUCLEUS PULPOSUS, L2-3: ICD-10-CM

## 2024-11-25 DIAGNOSIS — M22.42 CHONDROMALACIA OF LEFT PATELLA: ICD-10-CM

## 2024-11-25 RX ORDER — AMITRIPTYLINE HYDROCHLORIDE 10 MG/1
10 TABLET, FILM COATED ORAL NIGHTLY
Qty: 30 TABLET | Refills: 11 | Status: SHIPPED | OUTPATIENT
Start: 2024-11-25 | End: 2025-11-25

## 2024-11-25 RX ORDER — HYDROCODONE BITARTRATE AND ACETAMINOPHEN 7.5; 325 MG/1; MG/1
1 TABLET ORAL EVERY 8 HOURS PRN
Qty: 84 TABLET | Refills: 0 | Status: SHIPPED | OUTPATIENT
Start: 2024-12-28 | End: 2025-01-25

## 2024-11-25 RX ORDER — HYDROCODONE BITARTRATE AND ACETAMINOPHEN 7.5; 325 MG/1; MG/1
1 TABLET ORAL EVERY 8 HOURS PRN
Qty: 84 TABLET | Refills: 0 | Status: SHIPPED | OUTPATIENT
Start: 2024-11-30 | End: 2024-12-28

## 2024-11-25 NOTE — PROGRESS NOTES
Subjective   Patient ID: Roshni Loomis is a 59 y.o. female who presents for No chief complaint on file..  HPI  Roshni Loomis is back for pain management office visit  Continues with pain and lower limbs  The DEB was denied and we appealed  Continues to have pain in the left knee as before  Of note that generally the pain meds help her control symptoms until she has aggravation  . With aggravation  pain level goes up and we give her epidural steroid injection with fluoroscopy guidance for back and legs pain and knees intraarticular steroid injection for knee aggravation    These injection bring the pain level down to control with the meds    Pain level without medication is 8/10 , with the medication pain level 4 to 5 /10. Bc of hte aggravation  as above      The pain meds are helping control the pain and improving Activities of Daily living and quality of life and quality of sleep.     opioids treatment agreement Jan 2024    She has had DEB in the past and these help with her pain enough for her to be able to work at her job at [employer].    OARRS:  No data recorded  I have personally reviewed the OARRS report for Roshni Loomis. I have considered the risks of abuse, dependence, addiction and diversion    Is the patient prescribed a combination of a benzodiazepine and opioid?  No    Last Urine Drug Screen / ordered today: No  Recent Results (from the past 8760 hours)   Confirmation Opiate/Opioid/Benzo Prescription Compliance    Collection Time: 02/08/24  8:58 AM   Result Value Ref Range    Clonazepam <25 <25 ng/mL    7-Aminoclonazepam <25 <25 ng/mL    Alprazolam <25 <25 ng/mL    Alpha-Hydroxyalprazolam <25 <25 ng/mL    Midazolam <25 <25 ng/mL    Alpha-Hydroxymidazolam <25 <25 ng/mL    Chlordiazepoxide <25 <25 ng/mL    Diazepam <25 <25 ng/mL    Nordiazepam <25 <25 ng/mL    Temazepam <25 <25 ng/mL    Oxazepam <25 <25 ng/mL    Lorazepam <25 <25 ng/mL    Methadone <25 <25 ng/mL    EDDP <25 <25 ng/mL     6-Acetylmorphine <25 <25 ng/mL    Codeine <50 <50 ng/mL    Hydrocodone 115 (H) <25 ng/mL    Hydromorphone 44 (H) <25 ng/mL    Morphine  <50 <50 ng/mL    Norhydrocodone 143 (H) <25 ng/mL    Noroxycodone <25 <25 ng/mL    Oxycodone <25 <25 ng/mL    Oxymorphone <25 <25 ng/mL    Fentanyl <2.5 <2.5 ng/mL    Norfentanyl <2.5 <2.5 ng/mL    Tramadol <50 <50 ng/mL    O-Desmethyltramadol <50 <50 ng/mL    Zolpidem <25 <25 ng/mL    Zolpidem Metabolite (ZCA) <25 <25 ng/mL   Screen Opiate/Opioid/Benzo Prescription Compliance    Collection Time: 02/08/24  8:58 AM   Result Value Ref Range    Creatinine, Urine Random 140.5 20.0 - 320.0 mg/dL    Amphetamine Screen, Urine Presumptive Negative Presumptive Negative    Barbiturate Screen, Urine Presumptive Negative Presumptive Negative    Cannabinoid Screen, Urine Presumptive Negative Presumptive Negative    Cocaine Metabolite Screen, Urine Presumptive Negative Presumptive Negative    PCP Screen, Urine Presumptive Negative Presumptive Negative         Review of Systems     Current Outpatient Medications   Medication Instructions    amLODIPine (Norvasc) 10 mg tablet TAKE 1 TABLET BY MOUTH ONCE DAILY    cetirizine (ZYRTEC) 10 mg, oral, Daily    fluticasone (Flonase) 50 mcg/actuation nasal spray 1 spray, Each Nostril, Daily, Shake gently. Before first use, prime pump. After use, clean tip and replace cap.    folic acid (Folvite) 1 mg tablet 1 tablet, oral, Daily    HYDROcodone-acetaminophen (Norco) 7.5-325 mg tablet 1 tablet, oral, Every 8 hours PRN    methotrexate (Trexall) 2.5 mg tablet 5 tablets, oral, Once Weekly    naloxone (Narcan) 4 mg/0.1 mL nasal spray nasal, spray one bottle into nostril while patient lay on there back , repeat if needed    pantoprazole (PROTONIX) 40 mg, oral, Daily, Do not crush, chew, or split. Take 30 minutes before a meal        Past Medical History:   Diagnosis Date    Hypertension     Other cervical disc displacement, unspecified cervical region 01/27/2014     Cervical herniated disc    Other intervertebral disc displacement, lumbar region 07/03/2013    Herniated nucleus pulposus, L4-5        Past Surgical History:   Procedure Laterality Date    COLONOSCOPY  06/24/2013    Complete Colonoscopy    ENDOMETRIAL ABLATION  12/09/2016    [facility] Thermal Endometrial Ablation    KNEE ARTHROSCOPY W/ DEBRIDEMENT  12/09/2016    Knee Arthroscopy (Therapeutic)    OTHER SURGICAL HISTORY  12/07/2018    Uterine myomectomy        Family History   Problem Relation Name Age of Onset    Heart disease Mother      Liver cancer Father      Hypertension Sister      Cancer Brother      Pancreatic cancer Brother      Breast cancer Maternal Grandmother      Cancer Other aunt     Hypertension Other aunt     Heart disease Other aunt         Allergies   Allergen Reactions    Codeine Hives    Adhesive Rash     Leaves dark marks on skin.         MR lumbar spine wo IV contrast 07/19/2023    Narrative  Interpreted By:  MARLENI AZEVEDO MD  MRN: [id number]  Patient Name: ANDREA RODRIGUEZ    STUDY:  MRI L-SPINE WO; ;  7/19/2023 4:06 pm    INDICATION:  worsening right lumbar radic  M51.26: Herniated nucleus pulposus,  L2-3 M51.26: Herniated nucleus pulposus, L3-4 M51.26: Herniated  nucleus pulposus, L4-5.    COMPARISON:  MRI of the lumbar spine from 04/11/2018.    ACCESSION NUMBER(S):  [id number]    ORDERING CLINICIAN:  ARCHID MUSA    TECHNIQUE:  MRI of the lumbar spine was performed with acquisition of sagittal  T2, sagittal T1, sagittal STIR, axial T1, and axial T2 weighted  sequences.    FINDINGS:  This report assumes 5 non-rib bearing lumbar vertebral bodies. The  lowest intervertebral disc will be labeled L5-S1.    There is straightening of the lumbar spine. There is grade 1  anterolisthesis at L2-L3 and mild retrolisthesis at L4-L5 unchanged.  There is new slight grade 1 anterolisthesis at L3-L4. Vertebral body  heights are maintained. There is mild intervertebral disc height  narrowing at  L2-L3 and L4-L5 and moderate intervertebral disc height  narrowing at L3-L4, progressed since the prior MRI. There are chronic  degenerative plate changes at few levels, most pronounced at L3-L4  including degenerative Schmorl's node formation and osteophyte  formation, increased since the prior MRI. There is mild STIR  hyperintense signal located within the L3-L4 endplates which may  reflect degenerative osseous edema versus reactive hypervascularity.  Marrow signal is overall heterogeneous in appearance.    Conus medullaris terminates at the level of L1-L2 and is unremarkable  in appearance.    At L1-L2, there is a small diffuse disc bulge. There is no spinal  canal stenosis or neural foraminal narrowing. There is moderate left  facet osteoarthropathy.    At L2-L3, there is a small diffuse disc bulge which causes moderate  spinal canal stenosis, slightly progressed since prior. There is  extension of disc into the bilateral neural foramina and along with  moderate facet osteoarthropathy there is resulting mild bilateral  neural foraminal narrowing.    At L3-L4, there is a diffuse disc bulge which causes mild spinal  canal stenosis, unchanged. There is extension of disc and osteophyte  into the left neural foramen and along with facet osteoarthropathy  causes moderate neural foraminal narrowing. There is extension of  disc into the right neural foramen resulting in mild neural foraminal  narrowing, slightly progressed. There is marked bilateral facet  osteoarthropathy, unchanged.    At L4-L5, there is a diffuse disc bulge which partially effaces the  ventral thecal sac, new since the prior MRI. There is no striking  spinal canal stenosis. There is extension of disc and osteophyte into  the bilateral neural foramina and there is resulting moderate  bilateral neural foraminal narrowing. There is no striking facet  osteoarthropathy.    At L5-S1, there is no striking posterior disc contour abnormality or  spinal canal  stenosis. There is marked bilateral facet  osteoarthropathy, mildly progressed since prior. There is no striking  neural foraminal narrowing.    Impression  Compared to the MRI lumbar spine from 2018, there is progression of  degenerative changes at multiple levels, most pronounced at L2-L3,  L3-L4, and L4-L5 as detailed above. There is moderate spinal canal  stenosis at L2-L3, slightly progressed and stable mild spinal canal  stenosis at L3-L4.    This study was interpreted at [facility].      Objective     There were no vitals filed for this visit.     Physical Exam    GENERAL EXAM  Vital Signs: Vital signs to include heart rate, respiration rate, blood pressure, and temperature were reviewed.  General Appearance:  Awake, alert, healthy appearing, well developed, No acute distress.  Head: Normocephalic without evidence of head injury.  Neck: The appearance of the neck was normal without swelling with a midline trachea.  Eyes: The eyelids and eyebrows exhibited no abnormalities.  Pupils were not pin-point.  Sclera was without icterus.  Lungs: Respiration rhythm and depth was normal.  Respiratory movements were normal without labored breathing.  Cardiovascular: No peripheral edema was present.    Neurological: Patient was oriented to time, place, and person.  Speech was normal.  Balance, gait, and stance were unremarkable.    Psychiatric: Appearance was normal with appropriate dress.  Mood was euthymic and affect was normal.  Skin: Affected regions were without ecchymosis or skin lesions.      Assessment/Plan   Problem List Items Addressed This Visit             ICD-10-CM       Musculoskeletal and Injuries    Lower back pain - Primary M54.50       The patient is under my care temporarily while the primary pain management provider, Dr. Iván Johns, is unavailable on leave of absence.  I am reviewing continuing the patient's current treatment plan, including the prescribed  medications, to avoid abrupt discontinuation, which could lead to adverse outcomes.    Risk assessment:    -The patient has been informed of the risks associate with combination of these medications, including respiratory depression, sedation, eventual dependency or abuse  -The patient has been educated on safe medication use, including avoiding alcohol and not driving or operating heavy machinery while on these medications.  -Urine drug screening and/or PDMP (prescription drug monitoring program) reviewed performed to assess compliance and monitor for concerning patterns.    Plan:    -Current medications are to be continued for now, pending further assessment.  The patient was informed that if still under my care at follow-up visits the change in medication management may occur, including tapering or adjusting treatment as clinically appropriate and in line with evidence-based guidelines.  -This treatment plan is being provided under careful consideration to balance the need for pain management while minimizing risk.  Documentation will be forwarded to Dr. Johns upon the return for further review  - patient requires DEB. These help her for many months at a time.  - patient may benefit from amitriptyline 10mg at bedtime PRN        This note was generated with the aid of dictation software, there may be typos despite my attempts at proofreading.

## 2024-11-26 DIAGNOSIS — I10 BENIGN ESSENTIAL HYPERTENSION: ICD-10-CM

## 2024-11-26 RX ORDER — AMLODIPINE BESYLATE 10 MG/1
10 TABLET ORAL DAILY
Qty: 90 TABLET | Refills: 1 | Status: CANCELLED | OUTPATIENT
Start: 2024-11-26

## 2024-11-27 RX ORDER — AMLODIPINE BESYLATE 10 MG/1
10 TABLET ORAL DAILY
Qty: 30 TABLET | Refills: 0 | Status: SHIPPED | OUTPATIENT
Start: 2024-11-27

## 2024-12-02 ENCOUNTER — PHARMACY VISIT (OUTPATIENT)
Dept: PHARMACY | Facility: CLINIC | Age: 59
End: 2024-12-02
Payer: COMMERCIAL

## 2024-12-02 DIAGNOSIS — I10 BENIGN ESSENTIAL HYPERTENSION: ICD-10-CM

## 2024-12-02 PROCEDURE — RXMED WILLOW AMBULATORY MEDICATION CHARGE

## 2024-12-02 RX ORDER — AMLODIPINE BESYLATE 10 MG/1
10 TABLET ORAL DAILY
Qty: 30 TABLET | Refills: 0 | Status: SHIPPED | OUTPATIENT
Start: 2024-12-02

## 2024-12-16 ENCOUNTER — APPOINTMENT (OUTPATIENT)
Dept: PRIMARY CARE | Facility: CLINIC | Age: 59
End: 2024-12-16
Payer: COMMERCIAL

## 2024-12-16 DIAGNOSIS — J01.91 ACUTE RECURRENT SINUSITIS, UNSPECIFIED LOCATION: ICD-10-CM

## 2024-12-16 DIAGNOSIS — R13.19 ESOPHAGEAL DYSPHAGIA: ICD-10-CM

## 2024-12-16 DIAGNOSIS — I10 BENIGN ESSENTIAL HYPERTENSION: ICD-10-CM

## 2024-12-16 DIAGNOSIS — R04.0 BLEEDING FROM THE NOSE: ICD-10-CM

## 2024-12-16 DIAGNOSIS — R14.0 BLOATING: Primary | ICD-10-CM

## 2024-12-16 PROCEDURE — 99214 OFFICE O/P EST MOD 30 MIN: CPT | Performed by: INTERNAL MEDICINE

## 2024-12-16 RX ORDER — SODIUM CHLORIDE/ALOE VERA
1 GEL (GRAM) NASAL 2 TIMES DAILY PRN
Qty: 14.1 G | Refills: 1 | Status: SHIPPED | OUTPATIENT
Start: 2024-12-16

## 2024-12-16 RX ORDER — AMLODIPINE BESYLATE 10 MG/1
10 TABLET ORAL DAILY
Qty: 90 TABLET | Refills: 1 | Status: SHIPPED | OUTPATIENT
Start: 2024-12-16

## 2024-12-16 RX ORDER — DOXYCYCLINE 100 MG/1
100 CAPSULE ORAL 2 TIMES DAILY
Qty: 14 CAPSULE | Refills: 0 | Status: SHIPPED | OUTPATIENT
Start: 2024-12-16 | End: 2024-12-23

## 2024-12-16 RX ORDER — PANTOPRAZOLE SODIUM 40 MG/1
40 TABLET, DELAYED RELEASE ORAL 2 TIMES DAILY
Qty: 60 TABLET | Refills: 3 | Status: SHIPPED | OUTPATIENT
Start: 2024-12-16 | End: 2025-04-15

## 2024-12-16 NOTE — PROGRESS NOTES
Subjective   Patient ID: Roshni Loomis is a 59 y.o. female who presents via virtual visit for Follow-up.  An interactive audio and video telecommunication system which permits real time communications between the patient (at the originating site) and provider (at the distant site) was utilized to provide this telehealth service.    Verbal consent was requested and obtained from the patient on the day of encounter.  HPI  Headaches and sinus congestion  -dry nose and bleeding     URI again, mucous   Stomach bloating    Review of Systems   All other systems reviewed and are negative.      Assessment/Plan     Problem List Items Addressed This Visit       Benign essential hypertension    Relevant Medications    amLODIPine (Norvasc) 10 mg tablet    Acute sinusitis    Relevant Medications    doxycycline (Vibramycin) 100 mg capsule    Esophageal dysphagia    Relevant Medications    pantoprazole (Protonix) 40 mg EC tablet     Other Visit Diagnoses       Bloating    -  Primary    Relevant Medications    simethicone 250 mg capsule    Bleeding from the nose        Relevant Medications    sodium chloride-Aloe vera gel (Ayr Saline) gel topical gel

## 2025-01-03 NOTE — PATIENT INSTRUCTIONS
Aware of arrival time of 0730, NPO status and all pre-op instructions.  Pt's son will be with her on DOS.  
no

## 2025-01-06 ENCOUNTER — HOSPITAL ENCOUNTER (OUTPATIENT)
Dept: OPERATING ROOM | Facility: CLINIC | Age: 60
Setting detail: OUTPATIENT SURGERY
Discharge: HOME | End: 2025-01-06
Payer: COMMERCIAL

## 2025-01-06 VITALS
TEMPERATURE: 97.5 F | HEART RATE: 68 BPM | BODY MASS INDEX: 30.74 KG/M2 | HEIGHT: 65 IN | DIASTOLIC BLOOD PRESSURE: 79 MMHG | OXYGEN SATURATION: 98 % | SYSTOLIC BLOOD PRESSURE: 160 MMHG | RESPIRATION RATE: 16 BRPM | WEIGHT: 184.53 LBS

## 2025-01-06 DIAGNOSIS — M51.26 HERNIATED NUCLEUS PULPOSUS, L4-5: ICD-10-CM

## 2025-01-06 DIAGNOSIS — M51.26 HERNIATED NUCLEUS PULPOSUS, L3-4: ICD-10-CM

## 2025-01-06 PROCEDURE — 2500000004 HC RX 250 GENERAL PHARMACY W/ HCPCS (ALT 636 FOR OP/ED): Performed by: PHYSICAL MEDICINE & REHABILITATION

## 2025-01-06 PROCEDURE — 62323 NJX INTERLAMINAR LMBR/SAC: CPT | Performed by: PHYSICAL MEDICINE & REHABILITATION

## 2025-01-06 PROCEDURE — 7100000010 HC PHASE TWO TIME - EACH INCREMENTAL 1 MINUTE

## 2025-01-06 PROCEDURE — 3600000006 HC OR TIME - EACH INCREMENTAL 1 MINUTE - PROCEDURE LEVEL ONE

## 2025-01-06 PROCEDURE — 3600000001 HC OR TIME - INITIAL BASE CHARGE - PROCEDURE LEVEL ONE

## 2025-01-06 PROCEDURE — 2550000001 HC RX 255 CONTRASTS: Performed by: PHYSICAL MEDICINE & REHABILITATION

## 2025-01-06 PROCEDURE — 7100000009 HC PHASE TWO TIME - INITIAL BASE CHARGE

## 2025-01-06 PROCEDURE — 3700000012 HC SEDATION LEVEL 5+ TIME - INITIAL 15 MINUTES 5/> YEARS

## 2025-01-06 RX ORDER — SODIUM CHLORIDE 0.9 % (FLUSH) 0.9 %
SYRINGE (ML) INJECTION AS NEEDED
Status: COMPLETED | OUTPATIENT
Start: 2025-01-06 | End: 2025-01-06

## 2025-01-06 RX ORDER — FENTANYL CITRATE 50 UG/ML
INJECTION, SOLUTION INTRAMUSCULAR; INTRAVENOUS AS NEEDED
Status: COMPLETED | OUTPATIENT
Start: 2025-01-06 | End: 2025-01-06

## 2025-01-06 RX ORDER — DEXAMETHASONE SODIUM PHOSPHATE 10 MG/ML
INJECTION INTRAMUSCULAR; INTRAVENOUS AS NEEDED
Status: COMPLETED | OUTPATIENT
Start: 2025-01-06 | End: 2025-01-06

## 2025-01-06 RX ORDER — LIDOCAINE HYDROCHLORIDE 5 MG/ML
INJECTION, SOLUTION INFILTRATION; INTRAVENOUS AS NEEDED
Status: COMPLETED | OUTPATIENT
Start: 2025-01-06 | End: 2025-01-06

## 2025-01-06 RX ADMIN — LIDOCAINE HYDROCHLORIDE 2 ML: 5 INJECTION, SOLUTION INFILTRATION at 08:35

## 2025-01-06 RX ADMIN — IOHEXOL 0.5 ML: 240 INJECTION, SOLUTION INTRATHECAL; INTRAVASCULAR; INTRAVENOUS; ORAL at 08:35

## 2025-01-06 RX ADMIN — FENTANYL CITRATE 50 MCG: 0.05 INJECTION, SOLUTION INTRAMUSCULAR; INTRAVENOUS at 08:31

## 2025-01-06 RX ADMIN — DEXAMETHASONE SODIUM PHOSPHATE 10 MG: 10 INJECTION INTRAMUSCULAR; INTRAVENOUS at 08:18

## 2025-01-06 RX ADMIN — Medication 2 ML: at 08:18

## 2025-01-06 ASSESSMENT — ENCOUNTER SYMPTOMS
OCCASIONAL FEELINGS OF UNSTEADINESS: 0
DEPRESSION: 0
LOSS OF SENSATION IN FEET: 0

## 2025-01-06 ASSESSMENT — PATIENT HEALTH QUESTIONNAIRE - PHQ9
1. LITTLE INTEREST OR PLEASURE IN DOING THINGS: NOT AT ALL
2. FEELING DOWN, DEPRESSED OR HOPELESS: NOT AT ALL
SUM OF ALL RESPONSES TO PHQ9 QUESTIONS 1 AND 2: 0

## 2025-01-06 ASSESSMENT — PAIN SCALES - GENERAL
PAINLEVEL_OUTOF10: 0 - NO PAIN
PAINLEVEL_OUTOF10: 0 - NO PAIN

## 2025-01-06 ASSESSMENT — PAIN - FUNCTIONAL ASSESSMENT
PAIN_FUNCTIONAL_ASSESSMENT: 0-10
PAIN_FUNCTIONAL_ASSESSMENT: 0-10

## 2025-01-06 NOTE — POST-PROCEDURE NOTE
Lumbar epidural steroid injection with fluoroscopy guidance     Time-in:  830 am   Time-out: 846 am     Indications:  Failure to respond to conservative treatment with therapy and medications.     Sedation: 50 mcg of IV fentanyl   . Patient sedated but responsive to verbal commands. Monitoring of the vitals signs performed by qualified Registered Nurse during the entire procedure   Please see sedation record for sedation by RN    Level L4L5 midline      PROCEDURE:    The risks, benefits and alternatives of the procedure were discussed with the patient and agreed to proceed. The risks included but not limited to: infection, bleeding, paralysis, nerve injury sepsis and remotely death were discussed with the patient during the office and again in the pre procedure area.  The patient signed informed consent in the pre procedure area.     The patient was brought to procedure room and time out for the procedure was performed with the procedure room staff present. Patient placed in prone position on the procedure table and draped and covered appropriately.      Fluoroscopy machine was used to identify the L4L5 interspace at the midline.     Skin prepped and draped in sterile fashion over the lower lumbar spine area.  Skin was infiltrated with local anesthetic using 0.5% lidocaine.  Tuohy needle was introduced to the epidural space, that was identified with loss of resistance technique.  Then, adequate flow of contrast dye in the epidural space, was verified with fluoroscopy.  At that point, 10 mg of dexamethasone mixed with 5 mL of normal saline were injected.      felt the tingling down the lower limb during the injection     Procedure tolerated very well.  No complications encountered.  Post procedure care discussed with the patient, agreed to proceed.   Patient instructed on keeping track of the pain level post discharge.   Patient discharged home, from the recovery room, in stable condition.

## 2025-01-06 NOTE — H&P
History Of Present Illness  Roshni Loomis is a 59 y.o. female presenting with lumbar radic from lumbar herniated disc.     Past Medical History  Past Medical History:   Diagnosis Date    Hypertension     Other cervical disc displacement, unspecified cervical region 01/27/2014    Cervical herniated disc    Other intervertebral disc displacement, lumbar region 07/03/2013    Herniated nucleus pulposus, L4-5       Surgical History  Past Surgical History:   Procedure Laterality Date    COLONOSCOPY  06/24/2013    Complete Colonoscopy    ENDOMETRIAL ABLATION  12/09/2016    [facility] Thermal Endometrial Ablation    KNEE ARTHROSCOPY W/ DEBRIDEMENT  12/09/2016    Knee Arthroscopy (Therapeutic)    OTHER SURGICAL HISTORY  12/07/2018    Uterine myomectomy        Social History  She reports that she has never smoked. She has never used smokeless tobacco. She reports that she does not drink alcohol and does not use drugs.    Family History  Family History   Problem Relation Name Age of Onset    Heart disease Mother      Liver cancer Father      Hypertension Sister      Cancer Brother      Pancreatic cancer Brother      Breast cancer Maternal Grandmother      Cancer Other aunt     Hypertension Other aunt     Heart disease Other aunt         Allergies  Codeine and Adhesive    Review of Systems  No Current Cardio pulmonary symptoms  Denied any fever or chills. No weight loss and no night sweats. No cough or sputum production. No diarrhea   The constipation has been responding to fibers and over the counter medications.     No bladder and bowel incontinence and no other changes in bladder and bowel. No skin changes.  Reports tiredness and fatigability only if the pain is not controlled.   Denied opioids diversion and abuse and denies alcoholism. Denies overuse of the pain medications.      Physical Exam    Heart regular breathing regular  Positive SLR increasing back and legs pain   Negative fito  plantar cutaneous  reflex are down going bilaterally     Last Recorded Vitals  See nursing notes     Relevant Results  Current Outpatient Medications on File Prior to Encounter   Medication Sig Dispense Refill    amitriptyline (Elavil) 10 mg tablet Take 1 tablet (10 mg) by mouth once daily at bedtime. 30 tablet 11    amLODIPine (Norvasc) 10 mg tablet TAKE 1 TABLET BY MOUTH ONCE DAILY 90 tablet 1    cetirizine (ZyrTEC) 10 mg tablet Take 1 tablet (10 mg) by mouth once daily. 30 tablet 0    fluticasone (Flonase) 50 mcg/actuation nasal spray Administer 1 spray into each nostril once daily. Shake gently. Before first use, prime pump. After use, clean tip and replace cap. 16 g 11    folic acid (Folvite) 1 mg tablet Take 1 tablet (1 mg) by mouth once daily.      HYDROcodone-acetaminophen (Norco) 7.5-325 mg tablet Take 1 tablet by mouth every 8 hours if needed for severe pain (7 - 10) for up to 28 days. Do not fill before December 28, 2024. 84 tablet 0    methotrexate (Trexall) 2.5 mg tablet Take 5 tablets (12.5 mg total) by mouth 1 (one) time per week.      naloxone (Narcan) 4 mg/0.1 mL nasal spray Administer into affected nostril(s). spray one bottle into nostril while patient lay on there back , repeat if needed      pantoprazole (Protonix) 40 mg EC tablet Take 1 tablet (40 mg) by mouth 2 times a day. Do not crush, chew, or split. Take 30 minutes before a meal 60 tablet 3    simethicone 250 mg capsule Take 1 capsule (250 mg) by mouth 3 times a day as needed (bloating). 90 capsule 1    sodium chloride-Aloe vera gel (Ayr Saline) gel topical gel Apply 1 Application to affected nostril(s) 2 times a day as needed (nasal dryness). 14.1 g 1     No current facility-administered medications on file prior to encounter.         Assessment/Plan   Assessment & Plan  Herniated nucleus pulposus, L4-5    Herniated nucleus pulposus, L3-4      Proceeding with DEB       I spent 8 minutes in the professional and overall care of this patient.      Iván QURESHI  MD Nat

## 2025-01-14 PROCEDURE — RXMED WILLOW AMBULATORY MEDICATION CHARGE

## 2025-01-15 ENCOUNTER — PHARMACY VISIT (OUTPATIENT)
Dept: PHARMACY | Facility: CLINIC | Age: 60
End: 2025-01-15
Payer: COMMERCIAL

## 2025-01-23 ENCOUNTER — APPOINTMENT (OUTPATIENT)
Dept: PAIN MEDICINE | Facility: CLINIC | Age: 60
End: 2025-01-23
Payer: COMMERCIAL

## 2025-01-23 VITALS
HEIGHT: 65 IN | DIASTOLIC BLOOD PRESSURE: 78 MMHG | BODY MASS INDEX: 29.66 KG/M2 | OXYGEN SATURATION: 97 % | WEIGHT: 178 LBS | SYSTOLIC BLOOD PRESSURE: 152 MMHG | HEART RATE: 63 BPM

## 2025-01-23 DIAGNOSIS — M51.26 HERNIATED NUCLEUS PULPOSUS, L3-4: ICD-10-CM

## 2025-01-23 DIAGNOSIS — M51.26 HERNIATED NUCLEUS PULPOSUS, L2-3: ICD-10-CM

## 2025-01-23 DIAGNOSIS — M51.26 HERNIATED NUCLEUS PULPOSUS, L4-5: ICD-10-CM

## 2025-01-23 DIAGNOSIS — M22.42 CHONDROMALACIA OF LEFT PATELLA: ICD-10-CM

## 2025-01-23 RX ORDER — HYDROCODONE BITARTRATE AND ACETAMINOPHEN 7.5; 325 MG/1; MG/1
1 TABLET ORAL EVERY 8 HOURS PRN
Qty: 70 TABLET | Refills: 0 | Status: SHIPPED | OUTPATIENT
Start: 2025-01-25 | End: 2025-02-22

## 2025-01-23 RX ORDER — HYDROCODONE BITARTRATE AND ACETAMINOPHEN 7.5; 325 MG/1; MG/1
1 TABLET ORAL EVERY 8 HOURS PRN
Qty: 70 TABLET | Refills: 0 | Status: SHIPPED | OUTPATIENT
Start: 2025-02-22 | End: 2025-03-22

## 2025-01-23 ASSESSMENT — PAIN - FUNCTIONAL ASSESSMENT: PAIN_FUNCTIONAL_ASSESSMENT: 0-10

## 2025-01-23 ASSESSMENT — PAIN DESCRIPTION - DESCRIPTORS
DESCRIPTORS: ACHING;DISCOMFORT;THROBBING
DESCRIPTORS: RADIATING;SHOOTING

## 2025-01-23 ASSESSMENT — PAIN SCALES - GENERAL
PAINLEVEL_OUTOF10: 8
PAINLEVEL_OUTOF10: 8

## 2025-01-23 NOTE — PROGRESS NOTES
[facility]  Claim [id number]   D.O.I 06.30.2005     · Herniated nucleus pulposus, L2-3 M51.26)   · Herniated nucleus pulposus, L3-4 (M51.26)   · Herniated nucleus pulposus, L4-5 (M51.26)   . Chondromalacia left knee M22.42      Chief complaint  Back pain down the right lower limb    History  Roshni Loomis is back for pain management office visit  She has a transforaminal epidural steroid injection earlier this month on January 6.  She is having 50% of pain relief.  She mentions that in the past she had a second injection and that helped her.  The pain is better she is able to cut back slowly on the pain medication but she continues to be limited with the right lower limb pain.  The pain does radiate from the back down the leg.  We given her midline L4-L5 injection the pain in the left is better.  I discussed with her about considering transforaminal epidural steroid injection on the L4-L5 hopefully helping better with the right lumbar radiculopathy pain.    The pain is deep aching and burning on the lateral aspect of the right leg it does wake her up at night on intermittent basis she also have the pain in the lower back.  Denies bowel or bladder incontinence.    In addition again today long discussion about putting effort in cutting back on pain medications. Discussed about pain level changing and we do not know if the medications at this amount are still needed until we try and cut back slowly on pain medications. If the cut is tolerated then we continue. If cut not tolerated then, will go back on the pain medications level.  The goal from this is to keep the pain medications at the lowest effective dose.    Pain level without medication is 6-7/10 , with the medication pain level 3-4/10.  Because she is still having aggravation of the pain    Pain disability index improvement by 3 to 4 points with the pain control with the meds. Forms filled by patient are scanned in the chart    The pain meds are helping control the  "pain and improving Activities of Daily living and quality of life and quality of sleep.    opioids treatment agreement today    Pill count today, using count tray, and in front of patient :  4    pills , last fill was on 12/28  for 84 tabs,  the count is correct  Oarrs pulled and reviewed, no concerns  last urine toxicology testing was compliant this was done on : february  Xray updated spine   ORT Score is  0  Pain pathology and pain generators spine   Modalities tried injection, surgery, physical therapy, TENS unit, nonsteroidal anti-inflammatory medication multiple epidural injection and knee injection    Review of Systems :  Denied any fever or chills. No weight loss and no night sweats. No cough or sputum production. No diarrhea   The constipation has been responding to fibers and over the counter medications.     No bladder and bowel incontinence and no other changes in bladder and bowel. No skin changes.  Reports tiredness and fatigability only if the pain is not controlled.     Denied opioids diversion and abuse and denies alcoholism. Denies overuse of the pain medications.  No reported euphoria sensation or getting a \"high\" on the pain medications.    The control of the pain with the pain medications is helping the control of the symptoms and allowing the function and activities of daily living, enjoyment of life, improving the quality of life and sleep with less interruption by the pain. The goal is symptomatic control of the nonmalignant chronic pain and not to repair the permanent damage in the tissues inducing the chronic pain conditions. We are aiming to shift the focus from the nonmalignant chronic pain to other aspects of life by symptomatically treating this chronic pain. If this pain is not treated it will lead to major morbidity and it is also associated with increased risks of mortality. The patient understands those very clearly and also understand high risks of morbidity and mortality if not " strictly adherent to the treatment recommendations and reporting any associated side effects. Also patient understand the full responsibility associated with these medications to avoid abuse or overuse or any use of these medications for anything besides treating the patient's own chronic pain and nothing else under any circumstances.        Physical examination  Awake, alert and oriented for time place and persons   My nurse  Janey QURESHI LPN   was present during the entire history and physical examination      Straight leg raising increase the pain in the back down the right leg at 60 degree plantar cutaneous are downgoing.  Straight leg raising was equivocal on the left.  Mary testing are negative no cane no assistive devices.  No aberrant pain behavior before extension 4+/5 this is an old finding decreased sensory on lateral aspect of the neck    Diagnosis  Problem List Items Addressed This Visit       Chondromalacia of left patella    Relevant Medications    HYDROcodone-acetaminophen (Norco) 7.5-325 mg tablet (Start on 1/25/2025)    HYDROcodone-acetaminophen (Norco) 7.5-325 mg tablet (Start on 2/22/2025)    Other Relevant Orders    Disability Placard    Herniated nucleus pulposus, L2-3    Relevant Medications    HYDROcodone-acetaminophen (Norco) 7.5-325 mg tablet (Start on 1/25/2025)    HYDROcodone-acetaminophen (Norco) 7.5-325 mg tablet (Start on 2/22/2025)    Other Relevant Orders    Disability Placard    Herniated nucleus pulposus, L4-5    Relevant Medications    HYDROcodone-acetaminophen (Norco) 7.5-325 mg tablet (Start on 1/25/2025)    HYDROcodone-acetaminophen (Norco) 7.5-325 mg tablet (Start on 2/22/2025)    Other Relevant Orders    FL pain management    Transforaminal    Disability Placard    Herniated nucleus pulposus, L3-4    Relevant Medications    HYDROcodone-acetaminophen (Norco) 7.5-325 mg tablet (Start on 1/25/2025)    HYDROcodone-acetaminophen (Norco) 7.5-325 mg tablet (Start on 2/22/2025)     Other Relevant Orders    Disability Placard        Plan  Reviewed the pain generators.  Went over the types of pain with neuropathic and nociceptive and different pathologies and therapeutic modalities. Discussed the mechanism of action of interventions from acupuncture, physical therapy , regular exercises, injections, botox, spinal cord stimulation, and role of surgery     Went over pathology of the intervertebral disc displacement and the anatomical relation to the Nerve roots and relation to the radicular symptoms. Went over treatment modalities with conservative treatment including acupuncture   and epidural steroid injection with fluoroscopy guidance and last resort of surgery    Based on the above findings and the clinical response to the opioids medications and improvement of the activities of daily living, sleep, and work performance. We made this complex decision to continue the opioids therapy in light of the evidence of the patient's responsibility in using the pain medications as prescribed for the nonmalignant chronic pain condition. We discussed about the use of the pain medications to treat the symptoms of chronic nonmalignant pain and we are not trying the repair the permanent damage in the tissues, rather we are trying to control the symptoms induced by the permanent damage to the tissues inducing the chronic pain condition and resulting disability. I explained the difference and discussed it with the patient and stressed the importance of knowing the difference especially because of the potential side effects and the potential addicting effect and habit forming nature of the dangerous drugs we are using to treat the symptoms of the chronic pain.      We discussed that we are prescribing the medications on good barber and legitimate medical reason.     We reviewed the side effects and precautions of opioids prescriptions as discussed in the opioids treatment agreement.    realizes the interaction  between the therapeutic classes including the respiratory depression and potential death     Random drug testing   we will submit     Again today we discussed about cutting back on the medication for helping with tolerance.  Today cut back hydrocodone to 70 tabs for the month  Also FMLA forms  Request PA for   Right L4L5 Transforaminal epidural steroid injection   the last 1 was via midline approach we will change the approach to give her a better chance to respond    Discussed about NSAIDS and I explained about the opioids sparing effect to allow keeping the opioids dose at minimal effective dose.   I went over the potential side effects of the NSAIDS on the gastrointestinal, renal and cardiovascular systems.      I detailed the side effects from the acetaminophen in the medication and made aware of those. I also explained about the cumulative effects on the organs and mainly the liver.     Given the opioids therapy , we discussed about the risk for accidental over dose on the pain medications, either for patient or other household. I went over the mechanism of action and mode of use of the Naloxone according to the  recommendations. I will provide a prescription for a kit.     Follow-up after the injection or earlier   if needed     The level of clinical decision making in this office visit,  is high, given the high risks of complications with the morbidity and mortality due to the fact that acute and chronic pain may pose a threat to life and bodily function, if under treated, poorly treated, or with failure to maintain adequate treatment and timely medical follow up. Additionally over treatment has its own set of complications including overdosing on the pain medications and also the habit forming potentials with the use of the medications used to treat chronic painful conditions including therapeutic classes classified as dangerous medications. Given the serious and fluctuating nature of pain level  and instensity with extensive consideration for whenever pain changes, there is always the risk of prolonged functional impairment requiring close patient monitoring with regular assessments and reassessments and high level medical decision making at every office visit. The amount and complexity of data reviewed is high given the patient clinical presentation, labs,  data, radiology reports, and other tests as discussed during office visits. Pertinent data whether positive or negative were taken in consideration in the process of making this high level medical decision.

## 2025-02-10 ENCOUNTER — APPOINTMENT (OUTPATIENT)
Dept: PRIMARY CARE | Facility: CLINIC | Age: 60
End: 2025-02-10
Payer: COMMERCIAL

## 2025-02-10 DIAGNOSIS — Z12.31 ENCOUNTER FOR SCREENING MAMMOGRAM FOR MALIGNANT NEOPLASM OF BREAST: ICD-10-CM
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
## 2025-02-10 DIAGNOSIS — I10 BENIGN ESSENTIAL HYPERTENSION: ICD-10-CM

## 2025-02-10 DIAGNOSIS — M54.42 ACUTE BILATERAL LOW BACK PAIN WITH BILATERAL SCIATICA: ICD-10-CM

## 2025-02-10 DIAGNOSIS — M54.41 ACUTE BILATERAL LOW BACK PAIN WITH BILATERAL SCIATICA: ICD-10-CM

## 2025-02-10 DIAGNOSIS — M62.838 MUSCLE SPASM: ICD-10-CM

## 2025-02-10 PROCEDURE — 99214 OFFICE O/P EST MOD 30 MIN: CPT | Performed by: INTERNAL MEDICINE

## 2025-02-10 RX ORDER — AMLODIPINE BESYLATE 10 MG/1
10 TABLET ORAL DAILY
Qty: 90 TABLET | Refills: 1 | Status: SHIPPED | OUTPATIENT
Start: 2025-02-10

## 2025-02-10 RX ORDER — NAPROXEN 500 MG/1
500 TABLET ORAL 2 TIMES DAILY PRN
Qty: 60 TABLET | Refills: 0 | Status: SHIPPED | OUTPATIENT
Start: 2025-02-10 | End: 2025-05-11

## 2025-02-10 RX ORDER — TIZANIDINE 4 MG/1
4 TABLET ORAL EVERY 6 HOURS PRN
Qty: 30 TABLET | Refills: 0 | Status: SHIPPED | OUTPATIENT
Start: 2025-02-10 | End: 2025-02-20
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
## 2025-02-17 DIAGNOSIS — M62.838 MUSCLE SPASM: ICD-10-CM

## 2025-02-17 RX ORDER — TIZANIDINE 4 MG/1
TABLET ORAL
Qty: 30 TABLET | Refills: 0 | Status: SHIPPED | OUTPATIENT
Start: 2025-02-17

## 2025-03-03 ENCOUNTER — HOSPITAL ENCOUNTER (OUTPATIENT)
Dept: OPERATING ROOM | Facility: CLINIC | Age: 60
Setting detail: OUTPATIENT SURGERY
Discharge: HOME | End: 2025-03-03
Payer: COMMERCIAL

## 2025-03-03 VITALS
RESPIRATION RATE: 15 BRPM | HEIGHT: 64 IN | TEMPERATURE: 97.2 F | HEART RATE: 51 BPM | OXYGEN SATURATION: 99 % | SYSTOLIC BLOOD PRESSURE: 162 MMHG | DIASTOLIC BLOOD PRESSURE: 72 MMHG | BODY MASS INDEX: 30.86 KG/M2 | WEIGHT: 180.78 LBS

## 2025-03-03 DIAGNOSIS — M51.26 HERNIATED NUCLEUS PULPOSUS, L4-5: ICD-10-CM

## 2025-03-03 PROCEDURE — 3600000006 HC OR TIME - EACH INCREMENTAL 1 MINUTE - PROCEDURE LEVEL ONE

## 2025-03-03 PROCEDURE — 7100000010 HC PHASE TWO TIME - EACH INCREMENTAL 1 MINUTE

## 2025-03-03 PROCEDURE — 2550000001 HC RX 255 CONTRASTS: Performed by: PHYSICAL MEDICINE & REHABILITATION

## 2025-03-03 PROCEDURE — 3600000001 HC OR TIME - INITIAL BASE CHARGE - PROCEDURE LEVEL ONE

## 2025-03-03 PROCEDURE — 2500000004 HC RX 250 GENERAL PHARMACY W/ HCPCS (ALT 636 FOR OP/ED): Performed by: PHYSICAL MEDICINE & REHABILITATION

## 2025-03-03 PROCEDURE — 7100000009 HC PHASE TWO TIME - INITIAL BASE CHARGE

## 2025-03-03 RX ORDER — SODIUM CHLORIDE 0.9 % (FLUSH) 0.9 %
SYRINGE (ML) INJECTION AS NEEDED
Status: COMPLETED | OUTPATIENT
Start: 2025-03-03 | End: 2025-03-03

## 2025-03-03 RX ORDER — FENTANYL CITRATE 50 UG/ML
INJECTION, SOLUTION INTRAMUSCULAR; INTRAVENOUS AS NEEDED
Status: COMPLETED | OUTPATIENT
Start: 2025-03-03 | End: 2025-03-03

## 2025-03-03 RX ORDER — DEXAMETHASONE SODIUM PHOSPHATE 10 MG/ML
INJECTION INTRAMUSCULAR; INTRAVENOUS AS NEEDED
Status: COMPLETED | OUTPATIENT
Start: 2025-03-03 | End: 2025-03-03

## 2025-03-03 RX ORDER — LIDOCAINE HYDROCHLORIDE 5 MG/ML
INJECTION, SOLUTION INFILTRATION; INTRAVENOUS AS NEEDED
Status: COMPLETED | OUTPATIENT
Start: 2025-03-03 | End: 2025-03-03

## 2025-03-03 RX ADMIN — FENTANYL CITRATE 100 MCG: 0.05 INJECTION, SOLUTION INTRAMUSCULAR; INTRAVENOUS at 11:06

## 2025-03-03 RX ADMIN — Medication 5 ML: at 11:10

## 2025-03-03 RX ADMIN — IOHEXOL 1 ML: 240 INJECTION, SOLUTION INTRATHECAL; INTRAVASCULAR; INTRAVENOUS; ORAL at 11:11

## 2025-03-03 RX ADMIN — Medication 10 ML: at 11:10

## 2025-03-03 RX ADMIN — DEXAMETHASONE SODIUM PHOSPHATE 10 MG: 10 INJECTION INTRAMUSCULAR; INTRAVENOUS at 11:11

## 2025-03-03 RX ADMIN — LIDOCAINE HYDROCHLORIDE 5 ML: 5 INJECTION, SOLUTION INFILTRATION at 11:10

## 2025-03-03 ASSESSMENT — PAIN SCALES - GENERAL
PAINLEVEL_OUTOF10: 0 - NO PAIN
PAINLEVEL_OUTOF10: 0 - NO PAIN
PAINLEVEL_OUTOF10: 9

## 2025-03-03 ASSESSMENT — COLUMBIA-SUICIDE SEVERITY RATING SCALE - C-SSRS
1. IN THE PAST MONTH, HAVE YOU WISHED YOU WERE DEAD OR WISHED YOU COULD GO TO SLEEP AND NOT WAKE UP?: NO
2. HAVE YOU ACTUALLY HAD ANY THOUGHTS OF KILLING YOURSELF?: NO
6. HAVE YOU EVER DONE ANYTHING, STARTED TO DO ANYTHING, OR PREPARED TO DO ANYTHING TO END YOUR LIFE?: NO

## 2025-03-03 ASSESSMENT — PAIN - FUNCTIONAL ASSESSMENT
PAIN_FUNCTIONAL_ASSESSMENT: 0-10

## 2025-03-03 NOTE — H&P
History Of Present Illness  Roshni Loomis is a 59 y.o. female presenting with lumbar HNP resulting in the right L4L5 radic      Past Medical History  Past Medical History:   Diagnosis Date    Hypertension     Other cervical disc displacement, unspecified cervical region 01/27/2014    Cervical herniated disc    Other intervertebral disc displacement, lumbar region 07/03/2013    Herniated nucleus pulposus, L4-5       Surgical History  Past Surgical History:   Procedure Laterality Date    COLONOSCOPY  06/24/2013    Complete Colonoscopy    ENDOMETRIAL ABLATION  12/09/2016    Gynecologic Services Thermal Endometrial Ablation    KNEE ARTHROSCOPY W/ DEBRIDEMENT  12/09/2016    Knee Arthroscopy (Therapeutic)    OTHER SURGICAL HISTORY  12/07/2018    Uterine myomectomy        Social History  She reports that she has never smoked. She has never used smokeless tobacco. She reports that she does not drink alcohol and does not use drugs.    Family History  Family History   Problem Relation Name Age of Onset    Heart disease Mother      Liver cancer Father      Hypertension Sister      Cancer Brother      Pancreatic cancer Brother      Breast cancer Maternal Grandmother      Cancer Other aunt     Hypertension Other aunt     Heart disease Other aunt         Allergies  Codeine and Adhesive    Review of Systems  Denied any fever or chills. No weight loss and no night sweats. No cough or sputum production. No diarrhea   The constipation has been responding to fibers and over the counter medications.     No bladder and bowel incontinence and no other changes in bladder and bowel. No skin changes.  Reports tiredness and fatigability only if the pain is not controlled.   Denied opioids diversion and abuse and denies alcoholism. Denies overuse of the pain medications.  No Current Cardio pulmonary symptoms     Physical Exam    Heart regular breathing regular  Positive SLR on the right side  plantar cutaneous reflex are down going  bilaterally   Last Recorded Vitals   See nurses notes    Relevant Results  Current Outpatient Medications on File Prior to Encounter   Medication Sig Dispense Refill    amitriptyline (Elavil) 10 mg tablet Take 1 tablet (10 mg) by mouth once daily at bedtime. 30 tablet 11    amLODIPine (Norvasc) 10 mg tablet TAKE 1 TABLET BY MOUTH ONCE DAILY 90 tablet 1    cetirizine (ZyrTEC) 10 mg tablet Take 1 tablet (10 mg) by mouth once daily. 30 tablet 0    HYDROcodone-acetaminophen (Norco) 7.5-325 mg tablet Take 1 tablet by mouth every 8 hours if needed for severe pain (7 - 10) for up to 28 days. Do not fill before February 22, 2025. 70 tablet 0    methotrexate (Trexall) 2.5 mg tablet Take 5 tablets (12.5 mg total) by mouth 1 (one) time per week.      naloxone (Narcan) 4 mg/0.1 mL nasal spray Administer into affected nostril(s). spray one bottle into nostril while patient lay on there back , repeat if needed      naproxen (Naprosyn) 500 mg tablet Take 1 tablet (500 mg) by mouth 2 times a day as needed for mild pain (1 - 3) (pain). 60 tablet 0    tiZANidine (Zanaflex) 4 mg tablet TAKE 1 TABLET(4 MG) BY MOUTH EVERY 6 HOURS FOR UP TO 10 DAYS AS NEEDED FOR MUSCLE SPASMS 30 tablet 0     No current facility-administered medications on file prior to encounter.         Assessment/Plan   Assessment & Plan  Herniated nucleus pulposus, L4-5      Proceed with right L4L5 Transforaminal epidural steroid injection           I spent 8 minutes in the professional and overall care of this patient.      Iván Johns MD

## 2025-03-03 NOTE — PRE-SEDATION DOCUMENTATION
Last time ate or drank 6 pm LN   Mouth opening  4  cm  Dentures -  Soft palate Uvula and amygdala visible   TCD 4 digits  Rom flexion to 65 degrees  ext to 65 degrees   No Current Cardio pulmonary symptoms   Low risk for IV anxiolysis

## 2025-03-03 NOTE — POST-PROCEDURE NOTE
Right L4L5  Transforaminal epidural steroid injection         Time-in:  1104 am   Time-out:  1115 am   Sedation:  100 mcg of IV fentanyl   . Patient sedated but responsive to verbal commands. Monitoring of the vitals signs performed by qualified Registered Nurse during the entire procedure  Please see sedation record for sedation by RN.    Indications: Failure to respond to conservative treatment with therapy and medications.    PROCEDURE:    The risks, benefits and alternatives of the procedure were discussed with the patient and agreed to proceed. The risks included but not limited to: infection, bleeding, paralysis, nerve injury sepsis and remotely death were discussed with the patient during the office and again in the pre procedure area.  The patient signed informed consent in the pre procedure area.     The patient was brought to procedure room and time out for the procedure was performed with the procedure room staff present. Patient placed in prone position on the procedure table and draped and covered appropriately.      Fluoroscopy machine was used to identify the right L4L5  neuroforamen    Skin prepped and draped in sterile fashion over the lower lumbar spine area.  Skin was infiltrated with local anesthetic with 0.5% lidocaine.  Spinal needle was introduced to the neuroforamen, verified with fluoroscopy.  Adequate flow of contrast dye around the nerve root was verified with fluoroscopy.  At that point, 10 mg of dexamethasone mixed with 5 mL of normal saline were injected.      felt the tingling down the lower limb during the injection     Procedure tolerated very well.  No complications encountered.  Post procedure care discussed with the patient, agreed to proceed.   Patient instructed on keeping track of the pain level post discharge.   Patient discharged home, from the recovery room, in stable condition.
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
## 2025-03-12 DIAGNOSIS — M54.42 ACUTE BILATERAL LOW BACK PAIN WITH BILATERAL SCIATICA: ICD-10-CM

## 2025-03-12 DIAGNOSIS — M54.41 ACUTE BILATERAL LOW BACK PAIN WITH BILATERAL SCIATICA: ICD-10-CM

## 2025-03-12 RX ORDER — NAPROXEN 500 MG/1
500 TABLET ORAL 2 TIMES DAILY PRN
Qty: 60 TABLET | Refills: 0 | Status: SHIPPED | OUTPATIENT
Start: 2025-03-12

## 2025-03-18 ENCOUNTER — APPOINTMENT (OUTPATIENT)
Dept: PAIN MEDICINE | Facility: CLINIC | Age: 60
End: 2025-03-18
Payer: COMMERCIAL

## 2025-03-18 DIAGNOSIS — M22.42 CHONDROMALACIA OF LEFT PATELLA: ICD-10-CM

## 2025-03-18 DIAGNOSIS — M51.26 HERNIATED NUCLEUS PULPOSUS, L4-5: ICD-10-CM

## 2025-03-18 DIAGNOSIS — M51.26 HERNIATED NUCLEUS PULPOSUS, L3-4: ICD-10-CM

## 2025-03-18 DIAGNOSIS — M51.26 HERNIATED NUCLEUS PULPOSUS, L2-3: ICD-10-CM

## 2025-03-18 PROCEDURE — 99214 OFFICE O/P EST MOD 30 MIN: CPT | Performed by: PHYSICAL MEDICINE & REHABILITATION

## 2025-03-18 RX ORDER — HYDROCODONE BITARTRATE AND ACETAMINOPHEN 7.5; 325 MG/1; MG/1
1 TABLET ORAL EVERY 8 HOURS PRN
Qty: 70 TABLET | Refills: 0 | Status: SHIPPED | OUTPATIENT
Start: 2025-04-20 | End: 2025-05-18

## 2025-03-18 RX ORDER — HYDROCODONE BITARTRATE AND ACETAMINOPHEN 7.5; 325 MG/1; MG/1
1 TABLET ORAL EVERY 8 HOURS PRN
Qty: 70 TABLET | Refills: 0 | Status: SHIPPED | OUTPATIENT
Start: 2025-03-23 | End: 2025-04-20

## 2025-03-18 NOTE — PROGRESS NOTES
[facility]  Claim [id number]   D.O.I 06.30.2005     · Herniated nucleus pulposus, L2-3 M51.26)   · Herniated nucleus pulposus, L3-4 (M51.26)   · Herniated nucleus pulposus, L4-5 (M51.26)   . Chondromalacia left knee M22.42    Chief complaint  Back and lower limbs pain     History  Roshni Loomis is back for pain management office visit  Roshni Loomis was at home in [place].  Could not physically come to the office today .  I was at the office.  I used secure audiovisual communication provided by the Epic system. Roshni Loomis  agreed on this form of communication and consented to the visit via A/V method.  The patient also understood that this will be billed as office visit.   She had the epidural steroid injection in the beginning of the month.  That controlled the pain.  She is comfortable taking 2-1/2 tablet a day of the hydrocodone these are working to control the pain for her at this point of note that she was at 3 tablets a day earlier our goal is to try to cut back again.  Now the pain to the lower limbs is better since after the injection she is only dealing with the pain in the back this is mechanical across the back area at low pace when she is resting but it get worse with standing walking and weightbearing no bowel or bladder incontinence  We discussed with her about spinal cord stimulator explained to her the mechanism of action hopefully with the stimulator she will be able to control the pain better and try to cut back on the medication further and might not need intermittent epidural injection.  She is considering that but no final word on it.  She is outweighing the risks and benefit of each treatment modality.  She certainly hopes to be off the medication completely.  However she will not be able to function if the pain is not controlled.  In the past she tried to do it without pain control but she could not    Again today, Detailed discussion and explanation about the need to try  and cut back on pain  medications.  Entertained question about   pain level changing from acute, subacute to chronic pain.  Currently the pain and chronic.  The higher amount of medication were for the acute and subacute phase.  Therefore   we do not know exactly if the medications at this amount are still needed until we try and cut back slowly on pain medications. If the cut is tolerated then we continue trying to cut back further. If cut not tolerated then, will try additional none chemical methods like injection or physical therapy or we can go back on the pain medications level.  The goal from this is to keep the pain medications at the lowest effective dose and to control the tolerance that develops from the chronic use of opioid therapy.  Our goal is to keep the pain controlled with minimal effective dose.  That will help cutting back on the potential for side effects, and also will help decrease the amount of tolerance developing from the chronic use of opioid medication.       Pain level without medication is 8/10 , with the medication pain level between 2 and 3/10.     Pain disability index (PDI) improvement by 4-6 points, across different functional categories, with the pain control with the meds.  Went over the questionnaires during the AV visit today. Will fill the forms at the next in person visit.      The pain meds are helping control the pain and improving Activities of Daily living and quality of life and quality of sleep.    opioids treatment agreement Jan 2025    Pill count : last fill on 2/23 fro 70 tabs she reported being on schedule and have meds till 3/23  Oarrs pulled and reviewed, no concerns  last urine toxicology testing was compliant this was done on : Feb 2025  Xray updated spine  ORT (opioid risk tool) score is 0  Pain pathology and pain generators spine and radiculopathy and knee  Modalities tried injection, surgery, physical therapy, TENS unit, nonsteroidal anti-inflammatory medication multiple injections  "in the spine for epidural steroid injection and the intra-articular steroid injection and knee    Review of Systems :  Denied any fever or chills. No weight loss and no night sweats. No cough or sputum production. No diarrhea   The constipation has been responding to fibers and over the counter medications.     No bladder and bowel incontinence and no other changes in bladder and bowel. No skin changes.  Reports tiredness and fatigability only if the pain is not controlled.     Denied opioids diversion and abuse and denies alcoholism. Denies overuse of the pain medications.  No reported euphoria sensation or getting a \"high\" on the pain medications.    The control of the pain with the pain medications is helping the control of the symptoms and allowing the function and activities of daily living, enjoyment of life, improving the quality of life and sleep with less interruption by the pain. The goal is symptomatic control of the nonmalignant chronic pain and not to repair the permanent damage in the tissues inducing the chronic pain conditions. We are aiming to shift the focus from the nonmalignant chronic pain to other aspects of life by symptomatically treating this chronic pain. If this pain is not treated it will lead to major morbidity and it is also associated with increased risks of mortality. The patient understands those very clearly and also understand high risks of morbidity and mortality if not strictly adherent to the treatment recommendations and reporting any associated side effects. Also patient understand the full responsibility associated with these medications to avoid abuse or overuse or any use of these medications for anything besides treating the patient's own chronic pain and nothing else under any circumstances.        Physical examination  Awake, alert and oriented for time place and persons   Pupils are equal and reactive to light and accommodation    This is a secure A/V " visit    Diagnosis  Problem List Items Addressed This Visit       Chondromalacia of left patella    Relevant Medications    HYDROcodone-acetaminophen (Norco) 7.5-325 mg tablet (Start on 3/23/2025)    HYDROcodone-acetaminophen (Norco) 7.5-325 mg tablet (Start on 4/20/2025)    Herniated nucleus pulposus, L2-3    Relevant Medications    HYDROcodone-acetaminophen (Norco) 7.5-325 mg tablet (Start on 3/23/2025)    HYDROcodone-acetaminophen (Norco) 7.5-325 mg tablet (Start on 4/20/2025)    Herniated nucleus pulposus, L4-5    Relevant Medications    HYDROcodone-acetaminophen (Norco) 7.5-325 mg tablet (Start on 3/23/2025)    HYDROcodone-acetaminophen (Norco) 7.5-325 mg tablet (Start on 4/20/2025)    Herniated nucleus pulposus, L3-4    Relevant Medications    HYDROcodone-acetaminophen (Norco) 7.5-325 mg tablet (Start on 3/23/2025)    HYDROcodone-acetaminophen (Norco) 7.5-325 mg tablet (Start on 4/20/2025)        Plan  Reviewed the pain generators.  Went over the types of pain with neuropathic and nociceptive and different pathologies and therapeutic modalities. Discussed the mechanism of action of interventions from acupuncture, physical therapy , regular exercises, injections, botox, spinal cord stimulation, and role of surgery     Went over pathology of the intervertebral disc displacement and the anatomical relation to the Nerve roots and relation to the radicular symptoms. Went over treatment modalities with conservative treatment including acupuncture   and epidural steroid injection with fluoroscopy guidance and last resort of surgery    Based on the above findings and the clinical response to the opioids medications and improvement of the activities of daily living, sleep, and work performance. We made this complex decision to continue the opioids therapy in light of the evidence of the patient's responsibility in using the pain medications as prescribed for the nonmalignant chronic pain condition. We discussed about  the use of the pain medications to treat the symptoms of chronic nonmalignant pain and we are not trying the repair the permanent damage in the tissues, rather we are trying to control the symptoms induced by the permanent damage to the tissues inducing the chronic pain condition and resulting disability. I explained the difference and discussed it with the patient and stressed the importance of knowing the difference especially because of the potential side effects and the potential addicting effect and habit forming nature of the dangerous drugs we are using to treat the symptoms of the chronic pain.      We discussed that we are prescribing the medications on good barber and legitimate medical reason within the scope of professional medical practice.     We reviewed the side effects and precautions of opioids prescriptions as discussed in the opioids treatment agreement.    realizes the interaction between the therapeutic classes including the respiratory depression and potential death     Random drug testing   we will submit     At this time we will keep hydrocodone 7.5 at 70 tablet just 2-1/2 tablet a day I encouraged her to try to cut back more with the next goal to be at 2 tablet a day.  Also went again over the spinal cord stimulator and encouraged her to look into that deeper to use the stimulator instead of the pain medication.  She is going to check on and if she she will have question  For us we will answer    Discussed about NSAIDS and I explained about the opioids sparing effect to allow keeping the opioids dose at minimal effective dose.   I went over the potential side effects of the NSAIDS on the gastrointestinal, renal and cardiovascular systems.      I detailed the side effects from the acetaminophen in the medication and made aware of those. I also explained about the cumulative effects on the organs and mainly the liver.     Given the opioids therapy , we discussed about the risk for accidental  over dose on the pain medications, either for patient or other household. I went over the mechanism of action and mode of use of the Naloxone according to the  recommendations. I will provide a prescription for a kit.     Follow-up 8 weeks or earlier if needed     The level of clinical decision making in this office visit,  is high, given the high risks of complications with the morbidity and mortality due to the fact that acute and chronic pain may pose a threat to life and bodily function, if under treated, poorly treated, or with failure to maintain adequate treatment and timely medical follow up. Additionally over treatment has its own set of complications including overdosing on the pain medications and also the habit forming potentials with the use of the medications used to treat chronic painful conditions including therapeutic classes classified as dangerous medications. Given the serious and fluctuating nature of pain level and instensity with extensive consideration for whenever pain changes, there is always the risk of prolonged functional impairment requiring close patient monitoring with regular assessments and reassessments and high level medical decision making at every office visit. The amount and complexity of data reviewed is high given the patient clinical presentation, labs,  data, radiology reports, and other tests as discussed during office visits. Pertinent data whether positive or negative were taken in consideration in the process of making this high level medical decision.
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
## 2025-04-19 DIAGNOSIS — M54.41 ACUTE BILATERAL LOW BACK PAIN WITH BILATERAL SCIATICA: ICD-10-CM

## 2025-04-19 DIAGNOSIS — M54.42 ACUTE BILATERAL LOW BACK PAIN WITH BILATERAL SCIATICA: ICD-10-CM

## 2025-04-22 RX ORDER — NAPROXEN 500 MG/1
500 TABLET ORAL 2 TIMES DAILY PRN
Qty: 60 TABLET | Refills: 0 | Status: SHIPPED | OUTPATIENT
Start: 2025-04-22

## 2025-04-28 ENCOUNTER — OFFICE VISIT (OUTPATIENT)
Facility: CLINIC | Age: 60
End: 2025-04-28
Payer: COMMERCIAL

## 2025-04-28 VITALS
SYSTOLIC BLOOD PRESSURE: 142 MMHG | WEIGHT: 190.3 LBS | HEIGHT: 65 IN | DIASTOLIC BLOOD PRESSURE: 78 MMHG | BODY MASS INDEX: 31.71 KG/M2

## 2025-04-28 DIAGNOSIS — N95.2 VAGINAL ATROPHY: ICD-10-CM

## 2025-04-28 DIAGNOSIS — Z01.411 ENCOUNTER FOR GYNECOLOGICAL EXAMINATION WITH ABNORMAL FINDING: Primary | ICD-10-CM

## 2025-04-28 PROCEDURE — 3077F SYST BP >= 140 MM HG: CPT | Performed by: OBSTETRICS & GYNECOLOGY

## 2025-04-28 PROCEDURE — 3078F DIAST BP <80 MM HG: CPT | Performed by: OBSTETRICS & GYNECOLOGY

## 2025-04-28 PROCEDURE — 99396 PREV VISIT EST AGE 40-64: CPT | Performed by: OBSTETRICS & GYNECOLOGY

## 2025-04-28 PROCEDURE — 99396 PREV VISIT EST AGE 40-64: CPT | Mod: 25 | Performed by: OBSTETRICS & GYNECOLOGY

## 2025-04-28 PROCEDURE — 87491 CHLMYD TRACH DNA AMP PROBE: CPT | Performed by: OBSTETRICS & GYNECOLOGY

## 2025-04-28 PROCEDURE — 1036F TOBACCO NON-USER: CPT | Performed by: OBSTETRICS & GYNECOLOGY

## 2025-04-28 PROCEDURE — 3008F BODY MASS INDEX DOCD: CPT | Performed by: OBSTETRICS & GYNECOLOGY

## 2025-04-28 PROCEDURE — 87661 TRICHOMONAS VAGINALIS AMPLIF: CPT | Performed by: OBSTETRICS & GYNECOLOGY

## 2025-04-28 RX ORDER — ESTRADIOL 0.1 MG/G
2 CREAM VAGINAL NIGHTLY
Qty: 34 G | Refills: 3 | Status: SHIPPED | OUTPATIENT
Start: 2025-04-28 | End: 2026-04-28

## 2025-04-28 ASSESSMENT — ENCOUNTER SYMPTOMS
DEPRESSION: 0
OCCASIONAL FEELINGS OF UNSTEADINESS: 0
LOSS OF SENSATION IN FEET: 0

## 2025-04-28 ASSESSMENT — PAIN SCALES - GENERAL: PAINLEVEL_OUTOF10: 0-NO PAIN

## 2025-04-28 NOTE — PROGRESS NOTES
"Roshni Loomis is a 59 y.o.  who presents for her annual gynecologic exam     Complains:   Menopause symptoms   Vaginal dryness  Hot flushes     Menses:   menopause         History of abnormal pap: yes        OB History          3    Para   3    Term                AB        Living             SAB        IAB        Ectopic        Multiple        Live Births                   Medical History[1]  Surgical History[2]  Family History[3]  Social History[4]        REVIEW OF SYSTEMS  Abdomen: No abdominal pain, nausea, vomiting, diarrhea, or constipation.   No bloating, early satiety, indigestion, or increased flatulence.  Bladder: No dysuria, gross hematuria, urinary frequency, urinary urgency, or incontinence.  Breast: No breast lumps, nipple d/c, overlying skin changes, redness or skin retraction.  Allergies and current medication updated:Yes        EXAM:  /78   Ht 1.638 m (5' 4.5\")   Wt 86.3 kg (190 lb 4.8 oz)   BMI 32.16 kg/m²   GENERAL: pleasant, female in no apparent distress  HEENT: Normocephalic, atraumatic, mucus membranes moist and no lesions  NECK: Supple, full range of motion, no adenopathy and thyroid normal  DERMATOLOGY: Normal, without lesions, non-icteric and non-hirsute  BREAST: soft, non-tender, symmetric, no dominant mass, normal nipple-areolar complex, no lymphadenopathy and no nipple discharge  CHEST: Normal inspiratory effort  ABDOMEN: soft, non-tender and no masses  PELVIC: external genitalia normal, normal Bartholin's glands, urethra, [facility]'s glands, no vulvar lesions, no cervical lesions, good vaginal support, physiologic discharge present, normal appearing perineal body and perianal region  BIMANUAL: uterus normal size, shape and consistency, no adnexal masses and non-tender  RECTOVAGINAL: rectovaginal exam negative for any masses or nodularity.  NEURO: alert and oriented x3,exam grossly non-focal  EXTREMITIES: normal        ASSESSMENT:  Annual exam   "   PLAN:  Education reviewed and Recommended: Safe Sex/STD Prevention, Smoking Cessation, Self Breast Exams, Calcium Supplements, Weight Bearing Exercise, Low Fat, and Low  Cholesterol Diet, Skin Cancer Screening, Depression Evaluation,     Hormone Replacement Therapy: Risks and Benefits Reviewed.  Mammogram ordered.     Repeat Pap every 3-5 years if negative, yearly if history of abnormal Pap or cervical cancer.  HPV typing discussed with patient. No history of [facility] exposure.       Physical activity discussed.    Routine Health Maintenance through patient PCP  Follow up one year and as needed.  .     Johnny Whitehead MD           This note was produced with voice recognition software and may contain errors in grammar, spelling and content.  If any questions, please feel free to contact me.     I was accompanied by Female Chaperone, LPN  during the exam                         [1]   Past Medical History:  Diagnosis Date    Hypertension     Other cervical disc displacement, unspecified cervical region 01/27/2014    Cervical herniated disc    Other intervertebral disc displacement, lumbar region 07/03/2013    Herniated nucleus pulposus, L4-5   [2]   Past Surgical History:  Procedure Laterality Date    COLONOSCOPY  06/24/2013    Complete Colonoscopy    ENDOMETRIAL ABLATION  12/09/2016    [facility] Thermal Endometrial Ablation    KNEE ARTHROSCOPY W/ DEBRIDEMENT  12/09/2016    Knee Arthroscopy (Therapeutic)    OTHER SURGICAL HISTORY  12/07/2018    Uterine myomectomy   [3]   Family History  Problem Relation Name Age of Onset    Heart disease Mother      Liver cancer Father      Hypertension Sister      Cancer Brother      Pancreatic cancer Brother      Breast cancer Maternal Grandmother      Cancer Other aunt     Hypertension Other aunt     Heart disease Other aunt    [4]   Social History  Tobacco Use    Smoking status: Never    Smokeless tobacco: Never   Vaping Use    Vaping status: Never Used   Substance Use  Topics    Alcohol use: Never    Drug use: Never

## 2025-04-29 ENCOUNTER — HOSPITAL ENCOUNTER (OUTPATIENT)
Dept: RADIOLOGY | Facility: CLINIC | Age: 60
Discharge: HOME | End: 2025-04-29
Payer: COMMERCIAL

## 2025-04-29 VITALS — HEIGHT: 64 IN | BODY MASS INDEX: 32.44 KG/M2 | WEIGHT: 190 LBS

## 2025-04-29 DIAGNOSIS — Z12.31 ENCOUNTER FOR SCREENING MAMMOGRAM FOR MALIGNANT NEOPLASM OF BREAST: ICD-10-CM

## 2025-04-29 PROCEDURE — 77067 SCR MAMMO BI INCL CAD: CPT

## 2025-04-29 PROCEDURE — 77067 SCR MAMMO BI INCL CAD: CPT | Performed by: RADIOLOGY

## 2025-04-29 PROCEDURE — 77063 BREAST TOMOSYNTHESIS BI: CPT | Performed by: RADIOLOGY

## 2025-04-30 DIAGNOSIS — N64.89 BREAST ASYMMETRY: Primary | ICD-10-CM

## 2025-04-30 DIAGNOSIS — R92.8 ABNORMAL SCREENING MAMMOGRAM: ICD-10-CM

## 2025-05-01 LAB
C TRACH RRNA SPEC QL NAA+PROBE: NEGATIVE
N GONORRHOEA DNA SPEC QL PROBE+SIG AMP: NEGATIVE
T VAGINALIS RRNA SPEC QL NAA+PROBE: NEGATIVE

## 2025-05-06 ENCOUNTER — HOSPITAL ENCOUNTER (OUTPATIENT)
Dept: RADIOLOGY | Facility: CLINIC | Age: 60
Discharge: HOME | End: 2025-05-06
Payer: COMMERCIAL

## 2025-05-06 DIAGNOSIS — N64.89 BREAST ASYMMETRY: ICD-10-CM

## 2025-05-06 DIAGNOSIS — R92.8 OTHER ABNORMAL AND INCONCLUSIVE FINDINGS ON DIAGNOSTIC IMAGING OF BREAST: ICD-10-CM

## 2025-05-06 DIAGNOSIS — N64.89 OTHER SPECIFIED DISORDERS OF BREAST: ICD-10-CM

## 2025-05-06 DIAGNOSIS — R92.8 ABNORMAL SCREENING MAMMOGRAM: ICD-10-CM

## 2025-05-06 PROCEDURE — 77061 BREAST TOMOSYNTHESIS UNI: CPT | Mod: LEFT SIDE | Performed by: RADIOLOGY

## 2025-05-06 PROCEDURE — 77065 DX MAMMO INCL CAD UNI: CPT | Mod: LEFT SIDE | Performed by: RADIOLOGY

## 2025-05-06 PROCEDURE — 77065 DX MAMMO INCL CAD UNI: CPT | Mod: LT

## 2025-05-06 PROCEDURE — 76982 USE 1ST TARGET LESION: CPT

## 2025-05-06 PROCEDURE — 76642 ULTRASOUND BREAST LIMITED: CPT | Mod: LEFT SIDE | Performed by: RADIOLOGY

## 2025-05-06 PROCEDURE — 76642 ULTRASOUND BREAST LIMITED: CPT | Mod: LT

## 2025-05-07 ENCOUNTER — TELEPHONE (OUTPATIENT)
Dept: PRIMARY CARE | Facility: CLINIC | Age: 60
End: 2025-05-07

## 2025-05-07 ENCOUNTER — APPOINTMENT (OUTPATIENT)
Dept: PRIMARY CARE | Facility: CLINIC | Age: 60
End: 2025-05-07
Payer: COMMERCIAL

## 2025-05-07 VITALS — BODY MASS INDEX: 32.44 KG/M2 | DIASTOLIC BLOOD PRESSURE: 76 MMHG | SYSTOLIC BLOOD PRESSURE: 133 MMHG | WEIGHT: 189 LBS

## 2025-05-07 DIAGNOSIS — I10 BENIGN ESSENTIAL HYPERTENSION: ICD-10-CM

## 2025-05-07 DIAGNOSIS — Z00.00 HEALTH CARE MAINTENANCE: Primary | ICD-10-CM

## 2025-05-07 DIAGNOSIS — M35.1 MCTD (MIXED CONNECTIVE TISSUE DISEASE) (MULTI): ICD-10-CM

## 2025-05-07 PROCEDURE — 3078F DIAST BP <80 MM HG: CPT | Performed by: INTERNAL MEDICINE

## 2025-05-07 PROCEDURE — 3075F SYST BP GE 130 - 139MM HG: CPT | Performed by: INTERNAL MEDICINE

## 2025-05-07 PROCEDURE — 93000 ELECTROCARDIOGRAM COMPLETE: CPT | Performed by: INTERNAL MEDICINE

## 2025-05-07 PROCEDURE — 99396 PREV VISIT EST AGE 40-64: CPT | Performed by: INTERNAL MEDICINE

## 2025-05-07 NOTE — PROGRESS NOTES
Subjective   Patient ID: Roshni Loomis is a 59 y.o. female who presents for No chief complaint on file..    HPI CPE see updated front sheet met patient for first time longstanding higher blood pressure feels amlodipine may be causing some hair thinning bowels normal chronic low back pain followed by pain management bones muscles joints otherwise okay    Past medical history mixed connective tissue disease herniated disc hypertension    Medications amlodipine hydrocodone methotrexate but not taking currently    Allergies tape adhesive no known drug allergies?  See EMR    Social history no tobacco no alcohol    Family history Brother Lupus mother hypertension    Prevention some exercise just had mammogram GYN visit has had colonoscopy in the past some prior blood work reviewed    Review of Systems    Objective   There were no vitals taken for this visit.  Vital signs noted alert and oriented x 3 NCAT PERRLA EOMI nares without discharge OP benign TM normal bilateral EAC clear bilateral no AC nodes no JVD no thyromegaly chest clear to auscultation cor regular rate rhythm S1-S2 abdomen soft nontender normal active bowel sounds LS spine normal curvature negative straight leg raise extremities no clubbing cyanosis or edema normal distal pulses DTR 2+  Physical Exam    Assessment/Plan     Impression General Medical examination hypertension mixed connective tissue disease other diagnoses  Plan advised to review associated with her rheumatologist and whether she needs methotrexate or not check comprehensive panel advised on glucose potassium and kidney function as well as liver function check TSH advised on thyroid metabolism and her hair along with check CBC and advise on next colonoscopy but may change her blood pressure medicine if she so feels better in doing so check lipid panel advised on cholesterol profile good diet regular exercise good water consumption check EKG advised on heart and follow-up 1 to 3 months based  on labs TT 50 cc 26    Also having menopause symptoms 8 years after wakes up about 8 times per night with hot flashes

## 2025-05-09 LAB
ALBUMIN SERPL-MCNC: 4.2 G/DL (ref 3.6–5.1)
ALP SERPL-CCNC: 49 U/L (ref 37–153)
ALT SERPL-CCNC: 17 U/L (ref 6–29)
ANION GAP SERPL CALCULATED.4IONS-SCNC: 6 MMOL/L (CALC) (ref 7–17)
AST SERPL-CCNC: 18 U/L (ref 10–35)
BILIRUB SERPL-MCNC: 0.7 MG/DL (ref 0.2–1.2)
BUN SERPL-MCNC: 17 MG/DL (ref 7–25)
CALCIUM SERPL-MCNC: 9.4 MG/DL (ref 8.6–10.4)
CHLORIDE SERPL-SCNC: 104 MMOL/L (ref 98–110)
CHOLEST SERPL-MCNC: 134 MG/DL
CHOLEST/HDLC SERPL: 2.6 (CALC)
CO2 SERPL-SCNC: 30 MMOL/L (ref 20–32)
CREAT SERPL-MCNC: 0.72 MG/DL (ref 0.5–1.03)
EGFRCR SERPLBLD CKD-EPI 2021: 97 ML/MIN/1.73M2
ERYTHROCYTE [DISTWIDTH] IN BLOOD BY AUTOMATED COUNT: 12.5 % (ref 11–15)
GLUCOSE SERPL-MCNC: 79 MG/DL (ref 65–99)
HCT VFR BLD AUTO: 39.6 % (ref 35–45)
HDLC SERPL-MCNC: 51 MG/DL
HGB BLD-MCNC: 12.2 G/DL (ref 11.7–15.5)
LDLC SERPL CALC-MCNC: 70 MG/DL (CALC)
MCH RBC QN AUTO: 27.6 PG (ref 27–33)
MCHC RBC AUTO-ENTMCNC: 30.8 G/DL (ref 32–36)
MCV RBC AUTO: 89.6 FL (ref 80–100)
NONHDLC SERPL-MCNC: 83 MG/DL (CALC)
PLATELET # BLD AUTO: 338 THOUSAND/UL (ref 140–400)
PMV BLD REES-ECKER: 10.2 FL (ref 7.5–12.5)
POTASSIUM SERPL-SCNC: 4.4 MMOL/L (ref 3.5–5.3)
PROT SERPL-MCNC: 7.6 G/DL (ref 6.1–8.1)
RBC # BLD AUTO: 4.42 MILLION/UL (ref 3.8–5.1)
SODIUM SERPL-SCNC: 140 MMOL/L (ref 135–146)
TRIGL SERPL-MCNC: 54 MG/DL
TSH SERPL-ACNC: 1.21 MIU/L (ref 0.4–4.5)
WBC # BLD AUTO: 3.8 THOUSAND/UL (ref 3.8–10.8)

## 2025-05-13 ENCOUNTER — APPOINTMENT (OUTPATIENT)
Dept: PAIN MEDICINE | Facility: CLINIC | Age: 60
End: 2025-05-13
Payer: COMMERCIAL

## 2025-05-13 DIAGNOSIS — M51.26 HERNIATED NUCLEUS PULPOSUS, L4-5: Primary | ICD-10-CM

## 2025-05-13 DIAGNOSIS — M51.26 HERNIATED NUCLEUS PULPOSUS, L2-3: ICD-10-CM

## 2025-05-13 DIAGNOSIS — M22.42 CHONDROMALACIA OF LEFT PATELLA: ICD-10-CM

## 2025-05-13 DIAGNOSIS — M51.26 HERNIATED NUCLEUS PULPOSUS, L3-4: ICD-10-CM

## 2025-05-13 LAB
CYTOLOGY CMNT CVX/VAG CYTO-IMP: NORMAL
HPV HR 12 DNA GENITAL QL NAA+PROBE: NEGATIVE
HPV HR GENOTYPES PNL CVX NAA+PROBE: NEGATIVE
HPV16 DNA SPEC QL NAA+PROBE: NEGATIVE
HPV18 DNA SPEC QL NAA+PROBE: NEGATIVE
LAB AP HPV GENOTYPE QUESTION: YES
LAB AP HPV HR: NORMAL
LAB AP PAP ADDITIONAL TESTS: NORMAL
LABORATORY COMMENT REPORT: NORMAL
PATH REPORT.TOTAL CANCER: NORMAL

## 2025-05-13 PROCEDURE — 20610 DRAIN/INJ JOINT/BURSA W/O US: CPT | Performed by: PHYSICAL MEDICINE & REHABILITATION

## 2025-05-13 PROCEDURE — 99214 OFFICE O/P EST MOD 30 MIN: CPT | Performed by: PHYSICAL MEDICINE & REHABILITATION

## 2025-05-13 RX ORDER — HYDROCODONE BITARTRATE AND ACETAMINOPHEN 7.5; 325 MG/1; MG/1
1 TABLET ORAL EVERY 8 HOURS PRN
Qty: 70 TABLET | Refills: 0 | Status: SHIPPED | OUTPATIENT
Start: 2025-05-19 | End: 2025-06-16

## 2025-05-13 RX ORDER — TRIAMCINOLONE ACETONIDE 40 MG/ML
40 INJECTION, SUSPENSION INTRA-ARTICULAR; INTRAMUSCULAR ONCE
Status: COMPLETED | OUTPATIENT
Start: 2025-05-13 | End: 2025-05-13

## 2025-05-13 RX ORDER — HYDROCODONE BITARTRATE AND ACETAMINOPHEN 7.5; 325 MG/1; MG/1
1 TABLET ORAL EVERY 8 HOURS PRN
Qty: 70 TABLET | Refills: 0 | Status: SHIPPED | OUTPATIENT
Start: 2025-06-16 | End: 2025-07-14

## 2025-05-13 RX ADMIN — TRIAMCINOLONE ACETONIDE 40 MG: 40 INJECTION, SUSPENSION INTRA-ARTICULAR; INTRAMUSCULAR at 15:51

## 2025-05-13 NOTE — PROGRESS NOTES
[facility]  Claim [id number]   D.O.I 06.30.2005     · Herniated nucleus pulposus, L2-3 M51.26)   · Herniated nucleus pulposus, L3-4 (M51.26)   · Herniated nucleus pulposus, L4-5 (M51.26)   . Chondromalacia left knee M22.42      Chief complaint  Back and leg pain   Left knee pain     Verenda E LPN,   was present during the entire history and physical examination    History  Roshni Loomis is back for pain management office visit  Back and leg pain are relatively controlled since after the last DEB in early March   Now the left knee is getting worse again The pain is interfering with activities of daily living, quality of life and quality of sleep. It is limiting the functions and everything takes longer to complete because of the slowing related to the pain. Movements are cautious to avoid aggravation of the symptoms.   Pain in the l knee is medially and behind the  knee cap in the past intraarticular steroid injection helped with pain control  Pain meds are controlling the symptoms but not completely gone.  She is cutting back on pain meds an currently using 70 tabs for 28 days       Pain level without medication is 8/10 , with the medication pain level 2 to 3/10.     Pain disability index (PDI) improvement   across different functional categories, with the pain control with the meds. Forms filled by patient are scanned in the chart    The pain meds are helping control the pain and improving Activities of Daily living and quality of life and quality of sleep.    opioids treatment agreement Jan 2025    Pill count today, using count tray, and in front of patient, Nurse and myself :  13    pills , last fill was on 4/21  for 70 tabs of hydrocodone 7.5 mg /325,  the meds pill count today is correct  Oarrs pulled and reviewed, no concerns  last urine toxicology testing was compliant this was done on : Feb 2025  Xray updated knee and spine   ORT (opioid risk tool) score is  0  Pain pathology and pain generators spine and  "knee  Modalities tried injection, surgery, physical therapy, TENS unit, nonsteroidal anti-inflammatory medication multiple injection     Review of Systems :  Denied any fever or chills. No weight loss and no night sweats. No cough or sputum production. No diarrhea   The constipation has been responding to fibers and over the counter medications.     No bladder and bowel incontinence and no other changes in bladder and bowel. No skin changes.  Reports tiredness and fatigability only if the pain is not controlled.     I further Asked about symptoms or changes affecting the vision, hearing, breathing, digestive system, urinary symptoms, skin, other musculoskeletal condition, neurological conditions these are negative except as detailed in the history and physical examination above    Denied opioids diversion and abuse and denies alcoholism. Denies overuse of the pain medications.  No reported euphoria sensation or getting a \"high\" on the pain medications.    The control of the pain with the pain medications is helping the control of the symptoms and allowing the function and activities of daily living, enjoyment of life, improving the quality of life and sleep with less interruption by the pain. The goal is symptomatic control of the nonmalignant chronic pain and not to repair the permanent damage in the tissues inducing the chronic pain conditions. We are aiming to shift the focus from the nonmalignant chronic pain to other aspects of life by symptomatically treating this chronic pain. If this pain is not treated it will lead to major morbidity and it is also associated with increased risks of mortality. The patient understands those very clearly and also understand high risks of morbidity and mortality if not strictly adherent to the treatment recommendations and reporting any associated side effects. Also patient understand the full responsibility associated with these medications to avoid abuse or overuse or any " use of these medications for anything besides treating the patient's own chronic pain and nothing else under any circumstances.        Physical examination  Awake, alert and oriented for time place and persons   Pupils are equal and reactive to light and accommodation    SLR negatrive  Mary negative  plantar cutaneous reflex are down going bilaterally  Examination of the left knee showed mild clinical effusion. Normal skin color and texture palpation of the knee showed tenderness over the medial and lateral joint line and the sensation of crepitation upon range of motion.  Range of motion from -2 degrees to 105 degrees. No medial lateral instability. No drawer sign.  Lachman is negative.  Positive Glen both medially and laterally.  Negative pivot shift.  Homans' sign is negative.  Calves are soft.  Knee flexion and extension is 4/5 and limited by the pain.     Diagnosis  Problem List Items Addressed This Visit       Chondromalacia of left patella    Relevant Medications    HYDROcodone-acetaminophen (Norco) 7.5-325 mg tablet (Start on 5/19/2025)    HYDROcodone-acetaminophen (Norco) 7.5-325 mg tablet (Start on 6/16/2025)    triamcinolone acetonide (Kenalog-40) injection 40 mg (Completed) (Start on 5/13/2025  4:00 PM)    Herniated nucleus pulposus, L2-3    Relevant Medications    HYDROcodone-acetaminophen (Norco) 7.5-325 mg tablet (Start on 5/19/2025)    HYDROcodone-acetaminophen (Norco) 7.5-325 mg tablet (Start on 6/16/2025)    Herniated nucleus pulposus, L4-5 - Primary    Relevant Medications    HYDROcodone-acetaminophen (Norco) 7.5-325 mg tablet (Start on 5/19/2025)    HYDROcodone-acetaminophen (Norco) 7.5-325 mg tablet (Start on 6/16/2025)    Herniated nucleus pulposus, L3-4    Relevant Medications    HYDROcodone-acetaminophen (Norco) 7.5-325 mg tablet (Start on 5/19/2025)    HYDROcodone-acetaminophen (Norco) 7.5-325 mg tablet (Start on 6/16/2025)        Plan  Reviewed the pain generators.  Went over the  types of pain with neuropathic and nociceptive and different pathologies and therapeutic modalities. Discussed the mechanism of action of interventions from acupuncture, physical therapy , regular exercises, injections, botox, spinal cord stimulation, and role of surgery     Went over pathology of the intervertebral disc displacement and the anatomical relation to the Nerve roots and relation to the radicular symptoms. Went over treatment modalities with conservative treatment including acupuncture   and epidural steroid injection with fluoroscopy guidance and last resort of surgery    Based on the above findings and the clinical response to the opioids medications and improvement of the activities of daily living, sleep, and work performance. We made this complex decision to continue the opioids therapy in light of the evidence of the patient's responsibility in using the pain medications as prescribed for the nonmalignant chronic pain condition. We discussed about the use of the pain medications to treat the symptoms of chronic nonmalignant pain and we are not trying the repair the permanent damage in the tissues, rather we are trying to control the symptoms induced by the permanent damage to the tissues inducing the chronic pain condition and resulting disability. I explained the difference and discussed it with the patient and stressed the importance of knowing the difference especially because of the potential side effects and the potential addicting effect and habit forming nature of the dangerous drugs we are using to treat the symptoms of the chronic pain.      We discussed that we are prescribing the medications on good barber and legitimate medical reason within the scope of professional medical practice.     We reviewed the side effects and precautions of opioids prescriptions as discussed in the opioids treatment agreement.    realizes the interaction between the therapeutic classes including the  respiratory depression and potential death     Random drug testing   we will submit     Contniue with hydorcodone 7.5 mg 70 tabs for 28 days  Watned left knee intraarticular steroid injection . Will do that today and submit for C9       Discussed about NSAIDS and I explained about the opioids sparing effect to allow keeping the opioids dose at minimal effective dose.   I went over the potential side effects of the NSAIDS on the gastrointestinal, renal and cardiovascular systems.      I detailed the side effects from the acetaminophen in the medication and made aware of those. I also explained about the cumulative effects on the organs and mainly the liver.     Given the opioids therapy , we discussed about the risk for accidental over dose on the pain medications, either for patient or other household. I went over the mechanism of action and mode of use of the Naloxone according to the  recommendations. I will provide a prescription for a kit.     Follow-up 8 weeks or earlier if needed     The level of clinical decision making in this office visit,  is high, given the high risks of complications with the morbidity and mortality due to the fact that acute and chronic pain may pose a threat to life and bodily function, if under treated, poorly treated, or with failure to maintain adequate treatment and timely medical follow up. Additionally over treatment has its own set of complications including overdosing on the pain medications and also the habit forming potentials with the use of the medications used to treat chronic painful conditions including therapeutic classes classified as dangerous medications. Given the serious and fluctuating nature of pain level and instensity with extensive consideration for whenever pain changes, there is always the risk of prolonged functional impairment requiring close patient monitoring with regular assessments and reassessments and high level medical decision making at  every office visit. The amount and complexity of data reviewed is high given the patient clinical presentation, labs,  data, radiology reports, and other tests as discussed during office visits. Pertinent data whether positive or negative were taken in consideration in the process of making this high level medical decision.    Left knee intraarticular steroid injection   We discussed the risks and benefits of the injections including but not limited to nerve injury, infection, bleeding, headaches and paralysis and remotely death. I also discussed that I can not give guarantees of success from the procedure.  Additionally we discussed about the risks from long-term use of steroid therapy including but not limited to osteoporosis, bone fracture, and other complication affecting the skin , the bowel and reproductive system....    The patient agreed to proceed, will perform the procedure with sterile techniques.   Chaperone was present for the entire procedure, as above.  After appropriate draping, I used 40 mg of kenalog mixed with 5 cc of lidocaine and injected the left knee    post procedure care was discussed with the patient who agreed to proceed.  procedure tolerated very well. No complications encountered.

## 2025-05-19 DIAGNOSIS — M54.42 ACUTE BILATERAL LOW BACK PAIN WITH BILATERAL SCIATICA: ICD-10-CM

## 2025-05-19 DIAGNOSIS — M54.41 ACUTE BILATERAL LOW BACK PAIN WITH BILATERAL SCIATICA: ICD-10-CM
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
## 2025-05-23 RX ORDER — NAPROXEN 500 MG/1
500 TABLET ORAL 2 TIMES DAILY PRN
Qty: 60 TABLET | Refills: 0 | Status: SHIPPED | OUTPATIENT
Start: 2025-05-23

## 2025-06-20 ENCOUNTER — APPOINTMENT (OUTPATIENT)
Dept: RHEUMATOLOGY | Facility: CLINIC | Age: 60
End: 2025-06-20
Payer: COMMERCIAL

## 2025-06-26 PROCEDURE — RXMED WILLOW AMBULATORY MEDICATION CHARGE

## 2025-06-27 ENCOUNTER — PHARMACY VISIT (OUTPATIENT)
Dept: PHARMACY | Facility: CLINIC | Age: 60
End: 2025-06-27
Payer: COMMERCIAL

## 2025-07-04 NOTE — PROGRESS NOTES
Subjective   Patient ID: Roshni Loomis is a 59 y.o. female who presents for No chief complaint on file..    HPI    Last seen 2 years ago  Last visit NO SHOW in June 25 2023 note:   Long standing SLE A bit more active   +RNP  +SSA   SLE/MCTD /Scleritis /Sjogren's     5 nd FU visit SLE/MCTD /Scleritis /Sjogren's  Dr. Cleveland in Past  Dr. Dela Cruz PCP  Pain management    Tired  No energy   Joint pain   Sees Pain mgment for back   Lots of lifting 800 lbs     Off MTX missed appts      Visit 2023     IN PERSON  ACHY BONES AND PAIN HANDS AND KNEES   HAIR THINNING AGAIN   THE WEATHER   SOME SWOLLEN KNEES  SHORTAGE OF MTX CAN GET helped in past      LAB NORMAL 2023     NO CHANGE IN EXAM     NORMAL ROM  NO ACTIVE SYNOVITIS     dx  NO CHANGE     CONT MTX/fOLIC ACID  DECLINES PLAQUENIL   SEE 6 MOS     ROS      Current Medications[1]      has a past medical history of Allergic, Headache, Hypertension, Other cervical disc displacement, unspecified cervical region (01/27/2014), and Other intervertebral disc displacement, lumbar region (07/03/2013).    She has no past medical history of Fibroid.   Surgical History[2]   Social History[3]   family history includes Breast cancer in her maternal grandmother; Cancer in her brother and another family member; Heart disease in her mother and another family member; Hypertension in her sister and another family member; Liver cancer in her father; Pancreatic cancer in her brother.  Pain Management Panel  More data exists         Latest Ref Rng & Units 2/8/2024 4/25/2023   Pain Management Panel   Amphetamine Screen, Urine Presumptive Negative Presumptive Negative  PRESUMPTIVE NEGATIVE    Barbiturate Screen, Urine Presumptive Negative Presumptive Negative  PRESUMPTIVE NEGATIVE    Codeine <50 ng/mL <50  <50    Hydromorphone Urine <25 ng/mL 44  146    Morphine  <50 ng/mL <50  <50         There were no vitals filed for this visit.  Lab Results   Component Value Date    CRP 0.69 04/27/2020    TSH  1.21 05/08/2025       PHYSICAL EXAM      NODES   HEART  LUNGS  ABDOMEN   VASCULAR  NEURO   SKIN  JOINTS     PLAN  There are no diagnoses linked to this encounter.               [1]   Current Outpatient Medications   Medication Sig Dispense Refill    amLODIPine (Norvasc) 10 mg tablet TAKE 1 TABLET BY MOUTH ONCE DAILY 90 tablet 1    cetirizine (ZyrTEC) 10 mg tablet Take 1 tablet (10 mg) by mouth once daily. 30 tablet 0    estradiol (Estrace) 0.01 % (0.1 mg/gram) vaginal cream Insert 0.5 Applicatorfuls (2 g) into the vagina once daily at bedtime. At bedtime for 2 weeks, then at bedtime twice a week. 34 g 3    HYDROcodone-acetaminophen (Norco) 7.5-325 mg tablet Take 1 tablet by mouth every 8 hours if needed for severe pain (7 - 10) for up to 28 days. 2 to 3 tabs a day. That 70 tabs for the month Do not fill before June 16, 2025. 70 tablet 0    naloxone (Narcan) 4 mg/0.1 mL nasal spray Administer into affected nostril(s). spray one bottle into nostril while patient lay on there back , repeat if needed      naproxen (Naprosyn) 500 mg tablet TAKE 1 TABLET BY MOUTH TWICE DAILY AS NEEDED FOR MILD PAIN 60 tablet 0     No current facility-administered medications for this visit.   [2]   Past Surgical History:  Procedure Laterality Date    COLONOSCOPY  06/24/2013    Complete Colonoscopy    ENDOMETRIAL ABLATION  12/09/2016    [facility] Thermal Endometrial Ablation    KNEE ARTHROSCOPY W/ DEBRIDEMENT  12/09/2016    Knee Arthroscopy (Therapeutic)    OTHER SURGICAL HISTORY  12/07/2018    Uterine myomectomy   [3]   Social History  Tobacco Use    Smoking status: Never    Smokeless tobacco: Never   Vaping Use    Vaping status: Never Used   Substance Use Topics    Alcohol use: Never    Drug use: Never

## 2025-07-07 ENCOUNTER — APPOINTMENT (OUTPATIENT)
Dept: RHEUMATOLOGY | Facility: CLINIC | Age: 60
End: 2025-07-07
Payer: COMMERCIAL

## 2025-07-07 VITALS — DIASTOLIC BLOOD PRESSURE: 93 MMHG | OXYGEN SATURATION: 99 % | HEART RATE: 62 BPM | SYSTOLIC BLOOD PRESSURE: 150 MMHG

## 2025-07-07 DIAGNOSIS — R76.8 POSITIVE SM/RNP ANTIBODY: ICD-10-CM

## 2025-07-07 DIAGNOSIS — M32.9 SYSTEMIC LUPUS ERYTHEMATOSUS, UNSPECIFIED SLE TYPE, UNSPECIFIED ORGAN INVOLVEMENT STATUS (MULTI): ICD-10-CM

## 2025-07-07 DIAGNOSIS — M35.1 MCTD (MIXED CONNECTIVE TISSUE DISEASE) (MULTI): Primary | ICD-10-CM

## 2025-07-07 DIAGNOSIS — R76.8 SS-A ANTIBODY POSITIVE: ICD-10-CM

## 2025-07-07 PROCEDURE — 99214 OFFICE O/P EST MOD 30 MIN: CPT | Performed by: INTERNAL MEDICINE

## 2025-07-07 PROCEDURE — 3080F DIAST BP >= 90 MM HG: CPT | Performed by: INTERNAL MEDICINE

## 2025-07-07 PROCEDURE — 3077F SYST BP >= 140 MM HG: CPT | Performed by: INTERNAL MEDICINE

## 2025-07-07 RX ORDER — METHOTREXATE 2.5 MG/1
10 TABLET ORAL
Qty: 30 TABLET | Refills: 3 | Status: SHIPPED | OUTPATIENT
Start: 2025-07-07

## 2025-07-07 RX ORDER — FOLIC ACID 1 MG/1
1 TABLET ORAL DAILY
Qty: 30 TABLET | Refills: 5 | Status: SHIPPED | OUTPATIENT
Start: 2025-07-07 | End: 2026-01-03

## 2025-07-07 ASSESSMENT — PAIN SCALES - GENERAL: PAINLEVEL_OUTOF10: 6

## 2025-07-08 ENCOUNTER — APPOINTMENT (OUTPATIENT)
Dept: PAIN MEDICINE | Facility: CLINIC | Age: 60
End: 2025-07-08
Payer: COMMERCIAL

## 2025-07-08 DIAGNOSIS — M51.26 HERNIATED NUCLEUS PULPOSUS, L2-3: ICD-10-CM

## 2025-07-08 DIAGNOSIS — M51.26 HERNIATED NUCLEUS PULPOSUS, L4-5: ICD-10-CM

## 2025-07-08 DIAGNOSIS — M51.26 HERNIATED NUCLEUS PULPOSUS, L3-4: ICD-10-CM

## 2025-07-08 DIAGNOSIS — M22.42 CHONDROMALACIA OF LEFT PATELLA: ICD-10-CM

## 2025-07-08 PROBLEM — I47.10 SUPRAVENTRICULAR TACHYCARDIA BY ECG: Status: RESOLVED | Noted: 2023-02-20 | Resolved: 2025-07-08

## 2025-07-08 PROCEDURE — 99214 OFFICE O/P EST MOD 30 MIN: CPT | Performed by: PHYSICAL MEDICINE & REHABILITATION

## 2025-07-08 RX ORDER — HYDROCODONE BITARTRATE AND ACETAMINOPHEN 7.5; 325 MG/1; MG/1
1 TABLET ORAL EVERY 8 HOURS PRN
Qty: 70 TABLET | Refills: 0 | Status: SHIPPED | OUTPATIENT
Start: 2025-08-17 | End: 2025-09-14

## 2025-07-08 RX ORDER — HYDROCODONE BITARTRATE AND ACETAMINOPHEN 7.5; 325 MG/1; MG/1
1 TABLET ORAL EVERY 8 HOURS PRN
Qty: 70 TABLET | Refills: 0 | Status: SHIPPED | OUTPATIENT
Start: 2025-07-20 | End: 2025-08-17

## 2025-07-08 NOTE — PROGRESS NOTES
[facility]  Claim [id number]   D.O.I 06.30.2005     · Herniated nucleus pulposus, L2-3 M51.26)   · Herniated nucleus pulposus, L3-4 (M51.26)   · Herniated nucleus pulposus, L4-5 (M51.26)   . Chondromalacia left knee M22.42    Chief complaint  Back and Right leg pain      Verenda E LPN,   was present during the entire history and physical examination    History  Roshni Loomis is back for pain management office visit  She had the R L4L5 Transforaminal epidural steroid injection    and did well for the last 3 months. The injection was on 3/3. That is little over 3 months ago. The pain is starting to get worse gain  The pain in the back is deep achy worse in the mid back area.  This is associated with tight muscle bands.  This limits the range of motion of the lumbar spine mainly in forward flexion.  The pain in the back is radiating around the side on the lateral aspect of the right thigh going down toward the knee and then going medially over the right shin to the medial malleolus toward the dorsum of the right foot.   This pain down to the right lower limb is more of a burning tingling sensation.  The pain in the right lower limb worsens with bending forward or with any lifting, it improves with laying on the side and resting.  This is similar to the associated pain in the middle to lower back.  Denied any bowel or bladder incontinence.  With the worst pain there is tendency to drag the right foot and difficulty picking up the toes specially if tired or after a long day.    The skin is intact with no breakdown.  No vesicles.          Pain level without medication is 8/10 , with the medication pain level 4 to 5/10 bc of the aggravation      Pain disability index (PDI) improvement   across different functional categories, with the pain control with the meds. Forms filled by patient are scanned in the chart    The pain meds are helping control the pain and improving Activities of Daily living and quality of life and quality  "of sleep.    opioids treatment agreement Jan 2025    Pill count today, using count tray, and in front of patient, Nurse and myself :  26    pills , last fill was on 6/22  for 70 tabs,  the meds pill count today is correct  Oarrs pulled and reviewed, no concerns  last urine toxicology testing was compliant this was done on : Feb 2025  Xray updated spine   ORT (opioid risk tool) score is  0  Pain pathology and pain generators spine   Modalities tried injection, surgery, physical therapy, TENS unit, nonsteroidal anti-inflammatory medication       Review of Systems :  Denied any fever or chills. No weight loss and no night sweats. No cough or sputum production. No diarrhea   The constipation has been responding to fibers and over the counter medications.     No bladder and bowel incontinence and no other changes in bladder and bowel. No skin changes.  Reports tiredness and fatigability only if the pain is not controlled.     I further Asked about symptoms or changes affecting the vision, hearing, breathing, digestive system, urinary symptoms, skin, other musculoskeletal condition, neurological conditions these are negative except as detailed in the history and physical examination above    Denied opioids diversion and abuse and denies alcoholism. Denies overuse of the pain medications.  No reported euphoria sensation or getting a \"high\" on the pain medications.    The control of the pain with the pain medications is helping the control of the symptoms and allowing the function and activities of daily living, enjoyment of life, improving the quality of life and sleep with less interruption by the pain. The goal is symptomatic control of the nonmalignant chronic pain and not to repair the permanent damage in the tissues inducing the chronic pain conditions. We are aiming to shift the focus from the nonmalignant chronic pain to other aspects of life by symptomatically treating this chronic pain. If this pain is not " treated it will lead to major morbidity and it is also associated with increased risks of mortality. The patient understands those very clearly and also understand high risks of morbidity and mortality if not strictly adherent to the treatment recommendations and reporting any associated side effects. Also patient understand the full responsibility associated with these medications to avoid abuse or overuse or any use of these medications for anything besides treating the patient's own chronic pain and nothing else under any circumstances.        Physical examination  Awake, alert and oriented for time place and persons   Pupils are equal and reactive to light and accommodation    Examination of the lumbar spine showed tight muscle bands in the mid and lower back area, this is more pronounced on the right compared to the left.  Additionally, this is inducing mild reversal of the lumbar lordosis with functional scoliosis with right-sided concavity.  This scoliosis corrected with  bending of the lumbar spine.     Straight leg raising increased the pain in the back and down the lateral aspect of the right knee and then on the right shin medially to the medial malleolus.       There is decreased sensory to light touch on the lateral aspect of the thigh and around the right knee going down to the medial aspect of the leg anteriorly over the right shin.    Deep tendon reflexes is decreased for the right patellar tendon compared to the left side.  Achilles reflexes are present bilaterally and symmetric.  Plantar cutaneous are downgoing.  Right ankle extension is 4/5 compared to 5/5 on the left side.  Plantar flexion of the ankles are 5/5 bilaterally.  Big toe extension is 5/5 bilaterally.    Negative Tinel's sign over the right lateral femoral nerve.    Mary sign for axial loading and global rotation are negative.  No aberrant pain behavior.       Diagnosis  Problem List Items Addressed This Visit       Chondromalacia  of left patella    Relevant Medications    HYDROcodone-acetaminophen (Norco) 7.5-325 mg tablet (Start on 7/20/2025)    HYDROcodone-acetaminophen (Norco) 7.5-325 mg tablet (Start on 8/17/2025)    Herniated nucleus pulposus, L2-3    Relevant Medications    HYDROcodone-acetaminophen (Norco) 7.5-325 mg tablet (Start on 7/20/2025)    HYDROcodone-acetaminophen (Norco) 7.5-325 mg tablet (Start on 8/17/2025)    Herniated nucleus pulposus, L4-5    Relevant Medications    HYDROcodone-acetaminophen (Norco) 7.5-325 mg tablet (Start on 7/20/2025)    HYDROcodone-acetaminophen (Norco) 7.5-325 mg tablet (Start on 8/17/2025)    Other Relevant Orders    FL pain management    Transforaminal    Herniated nucleus pulposus, L3-4    Relevant Medications    HYDROcodone-acetaminophen (Norco) 7.5-325 mg tablet (Start on 7/20/2025)    HYDROcodone-acetaminophen (Norco) 7.5-325 mg tablet (Start on 8/17/2025)        Plan  Reviewed the pain generators.  Went over the types of pain with neuropathic and nociceptive and different pathologies and therapeutic modalities. Discussed the mechanism of action of interventions from acupuncture, physical therapy , regular exercises, injections, botox, spinal cord stimulation, and role of surgery     Went over pathology of the intervertebral disc displacement and the anatomical relation to the Nerve roots and relation to the radicular symptoms. Went over treatment modalities with conservative treatment including acupuncture   and epidural steroid injection with fluoroscopy guidance and last resort of surgery    Based on the above findings and the clinical response to the opioids medications and improvement of the activities of daily living, sleep, and work performance. We made this complex decision to continue the opioids therapy in light of the evidence of the patient's responsibility in using the pain medications as prescribed for the nonmalignant chronic pain condition. We discussed about the use of the  pain medications to treat the symptoms of chronic nonmalignant pain and we are not trying the repair the permanent damage in the tissues, rather we are trying to control the symptoms induced by the permanent damage to the tissues inducing the chronic pain condition and resulting disability. I explained the difference and discussed it with the patient and stressed the importance of knowing the difference especially because of the potential side effects and the potential addicting effect and habit forming nature of the dangerous drugs we are using to treat the symptoms of the chronic pain.      We discussed that we are prescribing the medications on good barber and legitimate medical reason within the scope of professional medical practice.     We reviewed the side effects and precautions of opioids prescriptions as discussed in the opioids treatment agreement.    realizes the interaction between the therapeutic classes including the respiratory depression and potential death     Random drug testing   we will submit      Discussed about NSAIDS and I explained about the opioids sparing effect to allow keeping the opioids dose at minimal effective dose.   I went over the potential side effects of the NSAIDS on the gastrointestinal, renal and cardiovascular systems.      I detailed the side effects from the acetaminophen in the medication and made aware of those. I also explained about the cumulative effects on the organs and mainly the liver.     Given the opioids therapy , we discussed about the risk for accidental over dose on the pain medications, either for patient or other household. I went over the mechanism of action and mode of use of the Naloxone according to the  recommendations. I will provide a prescription for a kit.     Focus of Today's Visit :  Consider R L45 Transforaminal epidural steroid injection      Risks not limited to infection, sepsis, abscess, bleeding , nerve injury, paralysis, and  death... I also discussed that I can not give guarantees of success from the procedure. Additionally we discussed about the risks from long-term use of steroid therapy including but not limited to osteoporosis, bone fracture, and other complication affecting the skin , the bowel and reproductive system....  Continue with pain meds  Signed FMLA forms       Follow-up 8 weeks or earlier if needed     The level of clinical decision making in this office visit,  is high, given the high risks of complications with the morbidity and mortality due to the fact that acute and chronic pain may pose a threat to life and bodily function, if under treated, poorly treated, or with failure to maintain adequate treatment and timely medical follow up. Additionally over treatment has its own set of complications including overdosing on the pain medications and also the habit forming potentials with the use of the medications used to treat chronic painful conditions including therapeutic classes classified as dangerous medications. Given the serious and fluctuating nature of pain level and instensity with extensive consideration for whenever pain changes, there is always the risk of prolonged functional impairment requiring close patient monitoring with regular assessments and reassessments and high level medical decision making at every office visit. The amount and complexity of data reviewed is high given the patient clinical presentation, labs,  data, radiology reports, and other tests as discussed during office visits. Pertinent data whether positive or negative were taken in consideration in the process of making this high level medical decision.

## 2025-07-10 ENCOUNTER — APPOINTMENT (OUTPATIENT)
Dept: PAIN MEDICINE | Facility: CLINIC | Age: 60
End: 2025-07-10
Payer: COMMERCIAL

## 2025-07-13 NOTE — PROGRESS NOTES
[facility]  Hand and Upper Extremity Service  Initial evaluation / Consultation           Chief Complaint: Right hand          59 y.o right hand dominant female presenting for right index finger and right hand issues. She sustained a crushing injury to her right index finger in 2017 resulting in an open distal phalanx fracture with nailbed injury. The injury was repaired on the day of injury and she followed up early on with Dr. Madsen who has since retired. She presents today for chronic fingernail deformity to that finger where the nail grows, falls off, and regrows again. She has persistent numbness and pain and sensitivity at the tip of the finger.          Please refer to New Patient Intake Form scanned into patient's electronic record for self reported past medical history, past surgical history, medications, allergies, family history, social history and 10 point review of systems    Examination:  Constitutional: Oriented to person, place, and time.  Appears well-developed and well-nourished.  Head: Normocephalic and atraumatic.  Eyes: Pupils are equal, round, and reactive to light.  Cardiovascular: Intact distal pulses.  Pulmonary/Chest/Breast: Effort normal. No respiratory distress.  Neurological: Alert and oriented to person, place, and time.  Skin: Skin is warm and dry.  Psychiatric: normal mood and affect.  Behavior is normal.  Musculoskeletal: Right hand reveals slight soft tissue envelope abnormality of the right index fingertip. Deformed nail plate which is starting to regrow and has not quite reached the end of the seral matrix yet. Sensitivity and subjectively diminished sensation around the fingertip but excellent IP joint range of motion of the right index finger.        Impression:  Sequela of crush injury right index finger      Plan: We discussed options of the nailbed deformity and the only options I have for her there is nail matrix ablation to leave her with  a finger that will not grow a nail again. But that will not help with her pain, numbness, or sensitivity. Revision amputation of the index finger through the more proximal of the distal phalanx or the DIP joint itself may eliminate the nail deformity and a lot of the sensitivity but she is unwilling to consider that option either. Other than that, I do not have any treatment options for her. She can return to see me in the future if her symptoms progress to the point she would consider nail ablation or amputation.       Follow up: As needed              Rony Milton MD  [facility]  Department of Orthopaedic Surgery  Hand and Upper Extremity Reconstruction      Scribe Attestation  By signing my name below, I, Marlo Sutherland   attest that this documentation has been prepared under the direction and in the presence of Dr. Rony Milton.      Dictation performed with the use of voice recognition software.  Syntax and grammatical errors may exist.

## 2025-07-15 ENCOUNTER — OFFICE VISIT (OUTPATIENT)
Dept: ORTHOPEDIC SURGERY | Facility: HOSPITAL | Age: 60
End: 2025-07-15
Payer: COMMERCIAL

## 2025-07-15 ENCOUNTER — HOSPITAL ENCOUNTER (OUTPATIENT)
Dept: RADIOLOGY | Facility: HOSPITAL | Age: 60
Discharge: HOME | End: 2025-07-15
Payer: COMMERCIAL

## 2025-07-15 VITALS — BODY MASS INDEX: 32.27 KG/M2 | HEIGHT: 64 IN | WEIGHT: 189 LBS

## 2025-07-15 DIAGNOSIS — M79.641 RIGHT HAND PAIN: Primary | ICD-10-CM

## 2025-07-15 DIAGNOSIS — M79.641 RIGHT HAND PAIN: ICD-10-CM

## 2025-07-15 DIAGNOSIS — L60.8 ACQUIRED DEFORMITY OF NAIL OF FINGER: Primary | ICD-10-CM

## 2025-07-15 PROCEDURE — 1036F TOBACCO NON-USER: CPT | Performed by: ORTHOPAEDIC SURGERY

## 2025-07-15 PROCEDURE — 99203 OFFICE O/P NEW LOW 30 MIN: CPT | Performed by: ORTHOPAEDIC SURGERY

## 2025-07-15 PROCEDURE — 73130 X-RAY EXAM OF HAND: CPT | Mod: RIGHT SIDE | Performed by: RADIOLOGY

## 2025-07-15 PROCEDURE — 73130 X-RAY EXAM OF HAND: CPT | Mod: RT

## 2025-07-15 PROCEDURE — 3008F BODY MASS INDEX DOCD: CPT | Performed by: ORTHOPAEDIC SURGERY

## 2025-07-15 PROCEDURE — 99213 OFFICE O/P EST LOW 20 MIN: CPT | Performed by: ORTHOPAEDIC SURGERY

## 2025-07-15 NOTE — LETTER
July 15, 2025     Santi Saleem MD  [address]  [facility] [address]  [facility] [address]    Patient: Roshni Loomis   YOB: 1965   Date of Visit: 7/15/2025       Dear Dr. Santi Saleem MD:    Thank you for referring Roshni Loomis to me for evaluation. Below are my notes for this consultation.  If you have questions, please do not hesitate to call me. I look forward to following your patient along with you.       Sincerely,     Rony Milton MD      CC: No Recipients  ______________________________________________________________________________________    [facility]  Hand and Upper Extremity Service  Initial evaluation / Consultation           Chief Complaint: Right hand          59 y.o right hand dominant female presenting for right index finger and right hand issues. She sustained a crushing injury to her right index finger in 2017 resulting in an open distal phalanx fracture with nailbed injury. The injury was repaired on the day of injury and she followed up early on with Dr. Madsen who has since retired. She presents today for chronic fingernail deformity to that finger where the nail grows, falls off, and regrows again. She has persistent numbness and pain and sensitivity at the tip of the finger.          Please refer to New Patient Intake Form scanned into patient's electronic record for self reported past medical history, past surgical history, medications, allergies, family history, social history and 10 point review of systems    Examination:  Constitutional: Oriented to person, place, and time.  Appears well-developed and well-nourished.  Head: Normocephalic and atraumatic.  Eyes: Pupils are equal, round, and reactive to light.  Cardiovascular: Intact distal pulses.  Pulmonary/Chest/Breast: Effort normal. No respiratory distress.  Neurological: Alert and oriented to person, place, and time.  Skin: Skin is warm and  dry.  Psychiatric: normal mood and affect.  Behavior is normal.  Musculoskeletal: Right hand reveals slight soft tissue envelope abnormality of the right index fingertip. Deformed nail plate which is starting to regrow and has not quite reached the end of the seral matrix yet. Sensitivity and subjectively diminished sensation around the fingertip but excellent IP joint range of motion of the right index finger.        Impression:  Sequela of crush injury right index finger      Plan: We discussed options of the nailbed deformity and the only options I have for her there is nail matrix ablation to leave her with a finger that will not grow a nail again. But that will not help with her pain, numbness, or sensitivity. Revision amputation of the index finger through the more proximal of the distal phalanx or the DIP joint itself may eliminate the nail deformity and a lot of the sensitivity but she is unwilling to consider that option either. Other than that, I do not have any treatment options for her. She can return to see me in the future if her symptoms progress to the point she would consider nail ablation or amputation.       Follow up: As needed              Rony Milton MD  [facility]  Department of Orthopaedic Surgery  Hand and Upper Extremity Reconstruction      Scribe Attestation  By signing my name below, I, Marlo Sutherland   attest that this documentation has been prepared under the direction and in the presence of Dr. Rony Milton.      Dictation performed with the use of voice recognition software.  Syntax and grammatical errors may exist.

## 2025-08-04 ENCOUNTER — HOSPITAL ENCOUNTER (OUTPATIENT)
Dept: OPERATING ROOM | Facility: CLINIC | Age: 60
Setting detail: OUTPATIENT SURGERY
Discharge: HOME | End: 2025-08-04
Payer: COMMERCIAL

## 2025-08-04 VITALS
HEIGHT: 64 IN | DIASTOLIC BLOOD PRESSURE: 74 MMHG | OXYGEN SATURATION: 99 % | TEMPERATURE: 98.1 F | SYSTOLIC BLOOD PRESSURE: 156 MMHG | HEART RATE: 55 BPM | RESPIRATION RATE: 16 BRPM | BODY MASS INDEX: 31.77 KG/M2 | WEIGHT: 186.07 LBS

## 2025-08-04 DIAGNOSIS — M51.26 HERNIATED NUCLEUS PULPOSUS, L4-5: ICD-10-CM

## 2025-08-04 PROCEDURE — 3600000006 HC OR TIME - EACH INCREMENTAL 1 MINUTE - PROCEDURE LEVEL ONE

## 2025-08-04 PROCEDURE — 3600000001 HC OR TIME - INITIAL BASE CHARGE - PROCEDURE LEVEL ONE

## 2025-08-04 PROCEDURE — 2550000001 HC RX 255 CONTRASTS: Performed by: PHYSICAL MEDICINE & REHABILITATION

## 2025-08-04 PROCEDURE — 2500000005 HC RX 250 GENERAL PHARMACY W/O HCPCS: Performed by: PHYSICAL MEDICINE & REHABILITATION

## 2025-08-04 PROCEDURE — 64483 NJX AA&/STRD TFRM EPI L/S 1: CPT | Performed by: PHYSICAL MEDICINE & REHABILITATION

## 2025-08-04 PROCEDURE — 2500000004 HC RX 250 GENERAL PHARMACY W/ HCPCS (ALT 636 FOR OP/ED): Performed by: PHYSICAL MEDICINE & REHABILITATION

## 2025-08-04 PROCEDURE — 3700000012 HC SEDATION LEVEL 5+ TIME - INITIAL 15 MINUTES 5/> YEARS

## 2025-08-04 PROCEDURE — 7100000009 HC PHASE TWO TIME - INITIAL BASE CHARGE

## 2025-08-04 PROCEDURE — 7100000010 HC PHASE TWO TIME - EACH INCREMENTAL 1 MINUTE

## 2025-08-04 RX ORDER — LIDOCAINE HYDROCHLORIDE 5 MG/ML
INJECTION, SOLUTION INFILTRATION; INTRAVENOUS AS NEEDED
Status: COMPLETED | OUTPATIENT
Start: 2025-08-04 | End: 2025-08-04

## 2025-08-04 RX ORDER — FENTANYL CITRATE 50 UG/ML
INJECTION, SOLUTION INTRAMUSCULAR; INTRAVENOUS AS NEEDED
Status: COMPLETED | OUTPATIENT
Start: 2025-08-04 | End: 2025-08-04

## 2025-08-04 RX ORDER — SODIUM CHLORIDE 9 MG/ML
INJECTION, SOLUTION INTRAMUSCULAR; INTRAVENOUS; SUBCUTANEOUS AS NEEDED
Status: COMPLETED | OUTPATIENT
Start: 2025-08-04 | End: 2025-08-04

## 2025-08-04 RX ORDER — DEXAMETHASONE SODIUM PHOSPHATE 10 MG/ML
INJECTION INTRAMUSCULAR; INTRAVENOUS AS NEEDED
Status: COMPLETED | OUTPATIENT
Start: 2025-08-04 | End: 2025-08-04

## 2025-08-04 RX ADMIN — LIDOCAINE HYDROCHLORIDE 3 ML: 5 INJECTION, SOLUTION INFILTRATION at 07:51

## 2025-08-04 RX ADMIN — FENTANYL CITRATE 100 MCG: 50 INJECTION, SOLUTION INTRAMUSCULAR; INTRAVENOUS at 07:46

## 2025-08-04 RX ADMIN — SODIUM CHLORIDE 4 ML: 9 INJECTION, SOLUTION INTRAMUSCULAR; INTRAVENOUS; SUBCUTANEOUS at 07:55

## 2025-08-04 RX ADMIN — DEXAMETHASONE SODIUM PHOSPHATE 10 MG: 10 INJECTION, SOLUTION INTRAMUSCULAR; INTRAVENOUS at 07:55

## 2025-08-04 RX ADMIN — IOHEXOL 1 ML: 240 INJECTION, SOLUTION INTRATHECAL; INTRAVASCULAR; INTRAVENOUS; ORAL at 07:52

## 2025-08-04 ASSESSMENT — PAIN - FUNCTIONAL ASSESSMENT
PAIN_FUNCTIONAL_ASSESSMENT: 0-10

## 2025-08-04 ASSESSMENT — PAIN SCALES - GENERAL
PAINLEVEL_OUTOF10: 0 - NO PAIN
PAINLEVEL_OUTOF10: 0 - NO PAIN
PAINLEVEL_OUTOF10: 6
PAINLEVEL_OUTOF10: 6
PAINLEVEL_OUTOF10: 0

## 2025-08-04 ASSESSMENT — COLUMBIA-SUICIDE SEVERITY RATING SCALE - C-SSRS
6. HAVE YOU EVER DONE ANYTHING, STARTED TO DO ANYTHING, OR PREPARED TO DO ANYTHING TO END YOUR LIFE?: NO
2. HAVE YOU ACTUALLY HAD ANY THOUGHTS OF KILLING YOURSELF?: NO
1. IN THE PAST MONTH, HAVE YOU WISHED YOU WERE DEAD OR WISHED YOU COULD GO TO SLEEP AND NOT WAKE UP?: NO

## 2025-08-04 NOTE — H&P
"History Of Present Illness  Roshni Loomis is a 59 y.o. female presenting with right L4L5 radic .     Past Medical History  Medical History[1]    Surgical History  Surgical History[2]     Social History  She reports that she has never smoked. She has never used smokeless tobacco. She reports that she does not drink alcohol and does not use drugs.    Family History  Family History[3]     Allergies  Codeine and Adhesive    Review of Systems   No Current Cardio pulmonary symptoms   Physical Exam   Postive SLR on R.  Heart regular breathing regular   Last Recorded Vitals  Blood pressure 152/85, pulse 60, temperature 36.6 °C (97.9 °F), temperature source Temporal, resp. rate 16, height 1.626 m (5' 4\"), weight 84.4 kg (186 lb 1.1 oz), SpO2 99%, not currently breastfeeding.    Relevant Results  Medications Ordered Prior to Encounter[4]      Assessment & Plan  Herniated nucleus pulposus, L4-5      For R L45 Transforaminal epidural steroid injection    this is one level    I spent 8 minutes in the professional and overall care of this patient.      Iván Johns MD         [1]   Past Medical History:  Diagnosis Date    Allergic     Headache     Hypertension     Other cervical disc displacement, unspecified cervical region 01/27/2014    Cervical herniated disc    Other intervertebral disc displacement, lumbar region 07/03/2013    Herniated nucleus pulposus, L4-5   [2]   Past Surgical History:  Procedure Laterality Date    COLONOSCOPY  06/24/2013    Complete Colonoscopy    ENDOMETRIAL ABLATION  12/09/2016    Gynecologic Services Thermal Endometrial Ablation    KNEE ARTHROSCOPY W/ DEBRIDEMENT  12/09/2016    Knee Arthroscopy (Therapeutic)    OTHER SURGICAL HISTORY  12/07/2018    Uterine myomectomy   [3]   Family History  Problem Relation Name Age of Onset    Heart disease Mother Erin     Liver cancer Father      Hypertension Sister Kallie     Cancer Brother Gabriel     Pancreatic cancer Brother Gabriel     Breast " cancer Maternal Grandmother Jocelyn     Cancer Other aunt     Hypertension Other aunt     Heart disease Other aunt    [4]   Current Outpatient Medications on File Prior to Encounter   Medication Sig Dispense Refill    amLODIPine (Norvasc) 10 mg tablet TAKE 1 TABLET BY MOUTH ONCE DAILY 90 tablet 1    folic acid (Folvite) 1 mg tablet Take 1 tablet (1 mg) by mouth once daily. 30 tablet 5    HYDROcodone-acetaminophen (Norco) 7.5-325 mg tablet Take 1 tablet by mouth every 8 hours if needed for severe pain (7 - 10) for up to 28 days. 2 to 3 tabs a day. That 70 tabs for the month Do not fill before July 20, 2025. 70 tablet 0    [START ON 8/17/2025] HYDROcodone-acetaminophen (Norco) 7.5-325 mg tablet Take 1 tablet by mouth every 8 hours if needed for severe pain (7 - 10) for up to 28 days. 2 to 3 tabs a day. That 70 tabs for the month Do not fill before August 17, 2025. 70 tablet 0    methotrexate (Trexall) 2.5 mg tablet Take 4 tablets (10 mg total) by mouth 1 (one) time per week.  Follow directions carefully, and ask to explain any part you do not understand. Take exactly as directed. 30 tablet 3    naproxen (Naprosyn) 500 mg tablet TAKE 1 TABLET BY MOUTH TWICE DAILY AS NEEDED FOR MILD PAIN 60 tablet 0    naloxone (Narcan) 4 mg/0.1 mL nasal spray Administer into affected nostril(s). spray one bottle into nostril while patient lay on there back , repeat if needed       No current facility-administered medications on file prior to encounter.

## 2025-08-04 NOTE — POST-PROCEDURE NOTE
Right L4L5 Transforaminal epidural steroid injection    (one level)     Time-in:  740 am   Time-out:  758 am   Sedation:  100 mcg of IV fentanyl   . Patient sedated but responsive to verbal commands. Monitoring of the vitals signs performed by qualified Registered Nurse during the entire procedure  Please see sedation record for sedation by RN.    Indications: Failure to respond to conservative treatment with therapy and medications.    PROCEDURE:    The risks, benefits and alternatives of the procedure were discussed with the patient and agreed to proceed. The risks included but not limited to: infection, bleeding, paralysis, nerve injury sepsis and remotely death were discussed with the patient during the office and again in the pre procedure area.  The patient signed informed consent in the pre procedure area.     The patient was brought to procedure room and time out for the procedure was performed with the procedure room staff present. Patient placed in prone position on the procedure table and draped and covered appropriately.      Fluoroscopy machine was used to identify the right L4L5 neuroforamen    Skin prepped and draped in sterile fashion over the lower lumbar spine area.  Skin was infiltrated with local anesthetic with 0.5% lidocaine.  Spinal needle was introduced to the neuroforamen, verified with fluoroscopy.  Adequate flow of contrast dye around the nerve root was verified with fluoroscopy.  At that point, 10 mg of dexamethasone mixed with 5 mL of normal saline were injected.      felt the tingling down the lower limb during the injection     Procedure tolerated very well.  No complications encountered.  Post procedure care discussed with the patient, agreed to proceed.   Patient instructed on keeping track of the pain level post discharge.   Patient discharged home, from the recovery room, in stable condition.

## 2025-08-04 NOTE — PRE-SEDATION DOCUMENTATION
Last time ate or drank  pm   Mouth opening  5  cm  Dentures -  Soft palate Uvula and amygdala visitble   TCD 5 digits  Rom flexion to 60 degrees  ext to 65 degrees   No Current Cardio pulmonary symptoms   Low risk for IV anxiolysis

## 2025-08-11 ENCOUNTER — APPOINTMENT (OUTPATIENT)
Dept: PRIMARY CARE | Facility: CLINIC | Age: 60
End: 2025-08-11
Payer: COMMERCIAL

## 2025-09-09 ENCOUNTER — APPOINTMENT (OUTPATIENT)
Dept: PAIN MEDICINE | Facility: CLINIC | Age: 60
End: 2025-09-09
Payer: COMMERCIAL

## 2026-01-07 ENCOUNTER — APPOINTMENT (OUTPATIENT)
Dept: RHEUMATOLOGY | Facility: CLINIC | Age: 61
End: 2026-01-07
Payer: COMMERCIAL